# Patient Record
Sex: MALE | Race: WHITE | URBAN - METROPOLITAN AREA
[De-identification: names, ages, dates, MRNs, and addresses within clinical notes are randomized per-mention and may not be internally consistent; named-entity substitution may affect disease eponyms.]

---

## 2017-11-27 ENCOUNTER — APPOINTMENT (OUTPATIENT)
Dept: CT IMAGING | Age: 52
End: 2017-11-27
Attending: EMERGENCY MEDICINE
Payer: MEDICARE

## 2017-11-27 ENCOUNTER — HOSPITAL ENCOUNTER (EMERGENCY)
Age: 52
Discharge: HOME | End: 2017-11-27
Attending: EMERGENCY MEDICINE
Payer: MEDICARE

## 2017-11-27 VITALS
BODY MASS INDEX: 27.09 KG/M2 | HEART RATE: 78 BPM | SYSTOLIC BLOOD PRESSURE: 147 MMHG | DIASTOLIC BLOOD PRESSURE: 79 MMHG | HEIGHT: 72 IN | WEIGHT: 200 LBS | TEMPERATURE: 97.9 F | RESPIRATION RATE: 16 BRPM | OXYGEN SATURATION: 98 %

## 2017-11-27 DIAGNOSIS — N20.0 KIDNEY STONE: Primary | ICD-10-CM

## 2017-11-27 LAB
ALBUMIN SERPL-MCNC: 4.1 G/DL (ref 3.5–5)
ALBUMIN/GLOB SERPL: 1 {RATIO} (ref 1.2–3.5)
ALP SERPL-CCNC: 94 U/L (ref 50–136)
ALT SERPL-CCNC: 90 U/L (ref 12–65)
AMORPH CRY URNS QL MICRO: ABNORMAL
ANION GAP SERPL CALC-SCNC: 9 MMOL/L (ref 7–16)
AST SERPL-CCNC: 66 U/L (ref 15–37)
BACTERIA URNS QL MICRO: 0 /HPF
BASOPHILS # BLD: 0 K/UL (ref 0–0.2)
BASOPHILS NFR BLD: 0 % (ref 0–2)
BILIRUB SERPL-MCNC: 0.9 MG/DL (ref 0.2–1.1)
BUN SERPL-MCNC: 15 MG/DL (ref 6–23)
CALCIUM SERPL-MCNC: 9.2 MG/DL (ref 8.3–10.4)
CASTS URNS QL MICRO: 0 /LPF
CHLORIDE SERPL-SCNC: 99 MMOL/L (ref 98–107)
CO2 SERPL-SCNC: 29 MMOL/L (ref 21–32)
CREAT SERPL-MCNC: 1.15 MG/DL (ref 0.8–1.5)
CRYSTALS URNS QL MICRO: 0 /LPF
DIFFERENTIAL METHOD BLD: ABNORMAL
EOSINOPHIL # BLD: 0.1 K/UL (ref 0–0.8)
EOSINOPHIL NFR BLD: 1 % (ref 0.5–7.8)
EPI CELLS #/AREA URNS HPF: 0 /HPF
ERYTHROCYTE [DISTWIDTH] IN BLOOD BY AUTOMATED COUNT: 12.1 % (ref 11.9–14.6)
GLOBULIN SER CALC-MCNC: 4.3 G/DL (ref 2.3–3.5)
GLUCOSE SERPL-MCNC: 129 MG/DL (ref 65–100)
HCT VFR BLD AUTO: 46.5 % (ref 41.1–50.3)
HGB BLD-MCNC: 16.6 G/DL (ref 13.6–17.2)
IMM GRANULOCYTES # BLD: 0 K/UL (ref 0–0.5)
IMM GRANULOCYTES NFR BLD AUTO: 0 % (ref 0–5)
LYMPHOCYTES # BLD: 5.1 K/UL (ref 0.5–4.6)
LYMPHOCYTES NFR BLD: 36 % (ref 13–44)
MCH RBC QN AUTO: 33.7 PG (ref 26.1–32.9)
MCHC RBC AUTO-ENTMCNC: 35.7 G/DL (ref 31.4–35)
MCV RBC AUTO: 94.3 FL (ref 79.6–97.8)
MONOCYTES # BLD: 1.4 K/UL (ref 0.1–1.3)
MONOCYTES NFR BLD: 10 % (ref 4–12)
MUCOUS THREADS URNS QL MICRO: 0 /LPF
NEUTS SEG # BLD: 7.4 K/UL (ref 1.7–8.2)
NEUTS SEG NFR BLD: 53 % (ref 43–78)
PLATELET # BLD AUTO: 277 K/UL (ref 150–450)
PMV BLD AUTO: 10.8 FL (ref 10.8–14.1)
POTASSIUM SERPL-SCNC: 4.3 MMOL/L (ref 3.5–5.1)
PROT SERPL-MCNC: 8.4 G/DL (ref 6.3–8.2)
RBC # BLD AUTO: 4.93 M/UL (ref 4.23–5.67)
RBC #/AREA URNS HPF: ABNORMAL /HPF
SODIUM SERPL-SCNC: 137 MMOL/L (ref 136–145)
WBC # BLD AUTO: 13.9 K/UL (ref 4.3–11.1)
WBC URNS QL MICRO: ABNORMAL /HPF

## 2017-11-27 PROCEDURE — 99283 EMERGENCY DEPT VISIT LOW MDM: CPT | Performed by: EMERGENCY MEDICINE

## 2017-11-27 PROCEDURE — 96361 HYDRATE IV INFUSION ADD-ON: CPT | Performed by: EMERGENCY MEDICINE

## 2017-11-27 PROCEDURE — 96375 TX/PRO/DX INJ NEW DRUG ADDON: CPT | Performed by: EMERGENCY MEDICINE

## 2017-11-27 PROCEDURE — 74011250636 HC RX REV CODE- 250/636: Performed by: EMERGENCY MEDICINE

## 2017-11-27 PROCEDURE — 74176 CT ABD & PELVIS W/O CONTRAST: CPT

## 2017-11-27 PROCEDURE — 96374 THER/PROPH/DIAG INJ IV PUSH: CPT | Performed by: EMERGENCY MEDICINE

## 2017-11-27 PROCEDURE — 81015 MICROSCOPIC EXAM OF URINE: CPT | Performed by: EMERGENCY MEDICINE

## 2017-11-27 PROCEDURE — 80053 COMPREHEN METABOLIC PANEL: CPT | Performed by: EMERGENCY MEDICINE

## 2017-11-27 PROCEDURE — 85025 COMPLETE CBC W/AUTO DIFF WBC: CPT | Performed by: EMERGENCY MEDICINE

## 2017-11-27 RX ORDER — SODIUM CHLORIDE 0.9 % (FLUSH) 0.9 %
5-10 SYRINGE (ML) INJECTION AS NEEDED
Status: DISCONTINUED | OUTPATIENT
Start: 2017-11-27 | End: 2017-11-27 | Stop reason: HOSPADM

## 2017-11-27 RX ORDER — BUPRENORPHINE AND NALOXONE 8; 2 MG/1; MG/1
FILM, SOLUBLE BUCCAL; SUBLINGUAL DAILY
COMMUNITY
End: 2018-01-30

## 2017-11-27 RX ORDER — ONDANSETRON 2 MG/ML
4 INJECTION INTRAMUSCULAR; INTRAVENOUS
Status: COMPLETED | OUTPATIENT
Start: 2017-11-27 | End: 2017-11-27

## 2017-11-27 RX ORDER — ONDANSETRON 4 MG/1
4 TABLET, ORALLY DISINTEGRATING ORAL
Qty: 20 TAB | Refills: 0 | Status: SHIPPED | OUTPATIENT
Start: 2017-11-27 | End: 2018-01-19 | Stop reason: CLARIF

## 2017-11-27 RX ORDER — DEXTROAMPHETAMINE SACCHARATE, AMPHETAMINE ASPARTATE, DEXTROAMPHETAMINE SULFATE AND AMPHETAMINE SULFATE 2.5; 2.5; 2.5; 2.5 MG/1; MG/1; MG/1; MG/1
10 TABLET ORAL
COMMUNITY
End: 2018-01-20

## 2017-11-27 RX ORDER — KETOROLAC TROMETHAMINE 30 MG/ML
30 INJECTION, SOLUTION INTRAMUSCULAR; INTRAVENOUS
Status: COMPLETED | OUTPATIENT
Start: 2017-11-27 | End: 2017-11-27

## 2017-11-27 RX ORDER — ALPRAZOLAM 2 MG/1
TABLET ORAL
COMMUNITY
End: 2018-01-20

## 2017-11-27 RX ORDER — SODIUM CHLORIDE 0.9 % (FLUSH) 0.9 %
5-10 SYRINGE (ML) INJECTION EVERY 8 HOURS
Status: DISCONTINUED | OUTPATIENT
Start: 2017-11-27 | End: 2017-11-27 | Stop reason: HOSPADM

## 2017-11-27 RX ORDER — HYDROCODONE BITARTRATE AND ACETAMINOPHEN 10; 325 MG/1; MG/1
1 TABLET ORAL
Qty: 15 TAB | Refills: 0 | Status: SHIPPED | OUTPATIENT
Start: 2017-11-27 | End: 2018-01-19 | Stop reason: CLARIF

## 2017-11-27 RX ADMIN — ONDANSETRON 4 MG: 2 INJECTION INTRAMUSCULAR; INTRAVENOUS at 13:52

## 2017-11-27 RX ADMIN — SODIUM CHLORIDE 1000 ML: 900 INJECTION, SOLUTION INTRAVENOUS at 13:51

## 2017-11-27 RX ADMIN — KETOROLAC TROMETHAMINE 30 MG: 30 INJECTION, SOLUTION INTRAMUSCULAR at 13:52

## 2017-11-27 NOTE — DISCHARGE INSTRUCTIONS

## 2017-11-27 NOTE — ED NOTES
I have reviewed discharge instructions with the patient. The patient verbalized understanding. Patient left ED via Discharge Method: ambulatory to Home alone in no acute distress. Opportunity for questions and clarification provided. Patient given 2 scripts. To continue your aftercare when you leave the hospital, you may receive an automated call from our care team to check in on how you are doing. This is a free service and part of our promise to provide the best care and service to meet your aftercare needs.  If you have questions, or wish to unsubscribe from this service please call [phone]. Thank you for Choosing our [facility] Emergency Department.

## 2017-11-27 NOTE — ED TRIAGE NOTES
Pt states pain in left flank since yesterday. States he has been able to urinate. Hx of kidney stones.

## 2017-11-27 NOTE — ED PROVIDER NOTES
HPI Comments: Patient with history kidney stones. Present with left flank pain starting last night. Has some dysuria still able to urinate with no blood. Continue today so came in. Mild nausea present. Patient is a 47 y.o. male presenting with flank pain. The history is provided by the patient. No  was used. Flank Pain    This is a new problem. The current episode started yesterday. The problem has not changed since onset. The problem occurs constantly. Patient reports not work related injury. The pain is associated with no known injury. The pain is present in the left side. The quality of the pain is described as aching and sharp. The pain does not radiate. The pain is moderate. Associated symptoms include dysuria. Pertinent negatives include no chest pain, no fever, no numbness, no headaches, no abdominal pain, no abdominal swelling, no bowel incontinence, no perianal numbness, no bladder incontinence, no leg pain, no paresthesias and no weakness. He has tried nothing for the symptoms. Risk factors include history of kidney stones. Past Medical History:   Diagnosis Date    Kidney stones        Past Surgical History:   Procedure Laterality Date    HX ORTHOPAEDIC      back x 5; right knee; left shoulder    HX UROLOGICAL      kidney stone         History reviewed. No pertinent family history. Social History     Social History    Marital status:      Spouse name: N/A    Number of children: N/A    Years of education: N/A     Occupational History    Not on file. Social History Main Topics    Smoking status: Current Every Day Smoker     Packs/day: 0.50     Years: 10.00    Smokeless tobacco: Never Used    Alcohol use No    Drug use: No    Sexual activity: Not on file     Other Topics Concern    Not on file     Social History Narrative         ALLERGIES: Codeine    Review of Systems   Constitutional: Negative for chills and fever.    HENT: Negative for rhinorrhea and sore throat. Eyes: Negative for pain and redness. Respiratory: Negative for chest tightness, shortness of breath and wheezing. Cardiovascular: Negative for chest pain and leg swelling. Gastrointestinal: Negative for abdominal pain, bowel incontinence, diarrhea, nausea and vomiting. Genitourinary: Positive for dysuria and flank pain. Negative for bladder incontinence and hematuria. Musculoskeletal: Negative for back pain, gait problem, neck pain and neck stiffness. Skin: Negative for color change and rash. Neurological: Negative for weakness, numbness, headaches and paresthesias. Vitals:    11/27/17 1306   BP: (!) 167/109   Pulse: 88   Resp: 16   Temp: 98 °F (36.7 °C)   SpO2: 98%   Weight: 90.7 kg (200 lb)   Height: 6' (1.829 m)            Physical Exam   Constitutional: He is oriented to person, place, and time. He appears well-developed and well-nourished. HENT:   Head: Normocephalic and atraumatic. Neck: Normal range of motion. Neck supple. Cardiovascular: Normal rate and regular rhythm. No murmur heard. Pulmonary/Chest: Effort normal and breath sounds normal. He has no wheezes. Abdominal: Soft. Bowel sounds are normal. There is tenderness (mild left flank). There is no rebound and no guarding. Musculoskeletal: Normal range of motion. He exhibits no edema. Neurological: He is alert and oriented to person, place, and time. Skin: Skin is warm and dry. Nursing note and vitals reviewed. MDM  Number of Diagnoses or Management Options  Diagnosis management comments: Kidney stone. Will discharge.         Amount and/or Complexity of Data Reviewed  Clinical lab tests: ordered and reviewed  Tests in the radiology section of CPT®: ordered and reviewed  Tests in the medicine section of CPT®: ordered and reviewed    Patient Progress  Patient progress: stable    ED Course       Procedures           Results Include:    Recent Results (from the past 24 hour(s))   CBC WITH AUTOMATED DIFF    Collection Time: 11/27/17  1:15 PM   Result Value Ref Range    WBC 13.9 (H) 4.3 - 11.1 K/uL    RBC 4.93 4.23 - 5.67 M/uL    HGB 16.6 13.6 - 17.2 g/dL    HCT 46.5 41.1 - 50.3 %    MCV 94.3 79.6 - 97.8 FL    MCH 33.7 (H) 26.1 - 32.9 PG    MCHC 35.7 (H) 31.4 - 35.0 g/dL    RDW 12.1 11.9 - 14.6 %    PLATELET 968 103 - 219 K/uL    MPV 10.8 10.8 - 14.1 FL    DF AUTOMATED      NEUTROPHILS 53 43 - 78 %    LYMPHOCYTES 36 13 - 44 %    MONOCYTES 10 4.0 - 12.0 %    EOSINOPHILS 1 0.5 - 7.8 %    BASOPHILS 0 0.0 - 2.0 %    IMMATURE GRANULOCYTES 0 0.0 - 5.0 %    ABS. NEUTROPHILS 7.4 1.7 - 8.2 K/UL    ABS. LYMPHOCYTES 5.1 (H) 0.5 - 4.6 K/UL    ABS. MONOCYTES 1.4 (H) 0.1 - 1.3 K/UL    ABS. EOSINOPHILS 0.1 0.0 - 0.8 K/UL    ABS. BASOPHILS 0.0 0.0 - 0.2 K/UL    ABS. IMM. GRANS. 0.0 0.0 - 0.5 K/UL   METABOLIC PANEL, COMPREHENSIVE    Collection Time: 11/27/17  1:15 PM   Result Value Ref Range    Sodium 137 136 - 145 mmol/L    Potassium 4.3 3.5 - 5.1 mmol/L    Chloride 99 98 - 107 mmol/L    CO2 29 21 - 32 mmol/L    Anion gap 9 7 - 16 mmol/L    Glucose 129 (H) 65 - 100 mg/dL    BUN 15 6 - 23 MG/DL    Creatinine 1.15 0.8 - 1.5 MG/DL    GFR est AA >60 >60 ml/min/1.73m2    GFR est non-AA >60 >60 ml/min/1.73m2    Calcium 9.2 8.3 - 10.4 MG/DL    Bilirubin, total 0.9 0.2 - 1.1 MG/DL    ALT (SGPT) 90 (H) 12 - 65 U/L    AST (SGOT) 66 (H) 15 - 37 U/L    Alk.  phosphatase 94 50 - 136 U/L    Protein, total 8.4 (H) 6.3 - 8.2 g/dL    Albumin 4.1 3.5 - 5.0 g/dL    Globulin 4.3 (H) 2.3 - 3.5 g/dL    A-G Ratio 1.0 (L) 1.2 - 3.5     URINE MICROSCOPIC    Collection Time: 11/27/17  1:47 PM   Result Value Ref Range    WBC 3-5 0 /hpf    RBC 5-10 0 /hpf    Epithelial cells 0 0 /hpf    Bacteria 0 0 /hpf    Casts 0 0 /lpf    Crystals, urine 0 0 /LPF    Amorphous Crystals 2+ (H) 0    Mucus 0 0 /lpf      CT UROGRAM WO CONT (Final result) Result time: 11/27/17 14:26:30     Final result by Sukhjinder Floyd MD (11/27/17 14:26:30)     Impression:     IMPRESSION:     1.  Acute mild left hydronephrosis caused by small partially obstructing calculi  at the left UVJ. 2.  Bilateral small nonobstructing renal calculi. 3.  Other incidental findings as above.               Narrative:     CT ABDOMEN AND PELVIS WITHOUT CONTRAST    HISTORY: flank pain, 52 years Male SEVERE LT FLANK PAIN SINCE LAST PM / HX OF  SMALL KIDNEY STONES / SX DUE TO STONES AS WELL     COMPARISON: None available    TECHNIQUE: No oral or intravenous contrast administered. Axial helical CT images  were obtained from above the diaphragm through the pelvis.  Radiation dose  reduction techniques were used for this study:  Our CT scanners use one or all  of the following: Automated exposure control, adjustment of the mA and/or kVp  according to patient's size, iterative reconstruction. FINDINGS:    ABDOMEN:  Minimal dependent subsegmental atelectasis bilateral lung bases. Normal-appearing liver, gallbladder, spleen, pancreas, bilateral adrenal glands.   There are punctate nonobstructing calculi in the bilateral renal medullary  levels, in addition there is a small nonobstructing calculus in the left renal  lower pole measuring 3 x 3 mm.  There is acute mild left hydronephrosis and  hydroureter, cause by multiple small partially obstructing calculi in the distal  left ureter extending to the ureterovesical junction, measuring up to 5 x 6 mm. Minimal calcific atherosclerosis of a normal caliber abdominal aorta.  No  evidence of significant lymphadenopathy.  Normal-appearing small bowel.  No  evidence of intraperitoneal free air or free fluid. PELVIS:  Normal-appearing urinary bladder.  Coarse prostatic calcifications.    Normal-appearing seminal vesicles.  Normal-appearing colon, appendix not  visualized on this noncontrast study.  No evidence of pelvic free fluid.  No  evidence of significant inguinal or pelvic sidewall lymphadenopathy.  L4-L5  posterior lumbar fusion hardware causing metallic streak artifact.  Visualized  osseous structures otherwise unremarkable.

## 2018-01-19 ENCOUNTER — HOSPITAL ENCOUNTER (EMERGENCY)
Age: 53
Discharge: HOME | End: 2018-01-20
Attending: EMERGENCY MEDICINE
Payer: MEDICARE

## 2018-01-19 ENCOUNTER — APPOINTMENT (OUTPATIENT)
Dept: GENERAL RADIOLOGY | Age: 53
End: 2018-01-19
Attending: EMERGENCY MEDICINE
Payer: MEDICARE

## 2018-01-19 DIAGNOSIS — R07.9 CHEST PAIN, UNSPECIFIED TYPE: Primary | ICD-10-CM

## 2018-01-19 DIAGNOSIS — M54.5 CHRONIC LOW BACK PAIN, UNSPECIFIED BACK PAIN LATERALITY, WITH SCIATICA PRESENCE UNSPECIFIED: ICD-10-CM

## 2018-01-19 DIAGNOSIS — G89.29 CHRONIC LOW BACK PAIN, UNSPECIFIED BACK PAIN LATERALITY, WITH SCIATICA PRESENCE UNSPECIFIED: ICD-10-CM

## 2018-01-19 LAB
ALBUMIN SERPL-MCNC: 3.8 G/DL (ref 3.5–5)
ALBUMIN/GLOB SERPL: 0.9 {RATIO} (ref 1.2–3.5)
ALP SERPL-CCNC: 101 U/L (ref 50–136)
ALT SERPL-CCNC: 96 U/L (ref 12–65)
AMPHET UR QL SCN: POSITIVE
ANION GAP SERPL CALC-SCNC: 8 MMOL/L (ref 7–16)
AST SERPL-CCNC: 70 U/L (ref 15–37)
BACTERIA URNS QL MICRO: 0 /HPF
BARBITURATES UR QL SCN: NEGATIVE
BASOPHILS # BLD: 0 K/UL (ref 0–0.2)
BASOPHILS NFR BLD: 0 % (ref 0–2)
BENZODIAZ UR QL: POSITIVE
BILIRUB SERPL-MCNC: 0.6 MG/DL (ref 0.2–1.1)
BUN SERPL-MCNC: 12 MG/DL (ref 6–23)
CALCIUM SERPL-MCNC: 8.9 MG/DL (ref 8.3–10.4)
CANNABINOIDS UR QL SCN: NEGATIVE
CASTS URNS QL MICRO: 0 /LPF
CHLORIDE SERPL-SCNC: 102 MMOL/L (ref 98–107)
CO2 SERPL-SCNC: 31 MMOL/L (ref 21–32)
COCAINE UR QL SCN: NEGATIVE
CREAT SERPL-MCNC: 0.92 MG/DL (ref 0.8–1.5)
CRYSTALS URNS QL MICRO: 0 /LPF
DIFFERENTIAL METHOD BLD: ABNORMAL
EOSINOPHIL # BLD: 0.2 K/UL (ref 0–0.8)
EOSINOPHIL NFR BLD: 2 % (ref 0.5–7.8)
EPI CELLS #/AREA URNS HPF: NORMAL /HPF
ERYTHROCYTE [DISTWIDTH] IN BLOOD BY AUTOMATED COUNT: 12.2 % (ref 11.9–14.6)
GLOBULIN SER CALC-MCNC: 4.1 G/DL (ref 2.3–3.5)
GLUCOSE SERPL-MCNC: 104 MG/DL (ref 65–100)
HCT VFR BLD AUTO: 43.7 % (ref 41.1–50.3)
HGB BLD-MCNC: 15.9 G/DL (ref 13.6–17.2)
IMM GRANULOCYTES # BLD: 0 K/UL (ref 0–0.5)
IMM GRANULOCYTES NFR BLD AUTO: 0 % (ref 0–5)
LYMPHOCYTES # BLD: 4.2 K/UL (ref 0.5–4.6)
LYMPHOCYTES NFR BLD: 38 % (ref 13–44)
MCH RBC QN AUTO: 34.2 PG (ref 26.1–32.9)
MCHC RBC AUTO-ENTMCNC: 36.4 G/DL (ref 31.4–35)
MCV RBC AUTO: 94 FL (ref 79.6–97.8)
METHADONE UR QL: NEGATIVE
MONOCYTES # BLD: 0.9 K/UL (ref 0.1–1.3)
MONOCYTES NFR BLD: 8 % (ref 4–12)
MUCOUS THREADS URNS QL MICRO: 0 /LPF
NEUTS SEG # BLD: 5.7 K/UL (ref 1.7–8.2)
NEUTS SEG NFR BLD: 52 % (ref 43–78)
OPIATES UR QL: NEGATIVE
OTHER OBSERVATIONS,UCOM: NORMAL
PCP UR QL: NEGATIVE
PLATELET # BLD AUTO: 223 K/UL (ref 150–450)
PMV BLD AUTO: 10.8 FL (ref 10.8–14.1)
POTASSIUM SERPL-SCNC: 3.6 MMOL/L (ref 3.5–5.1)
PROT SERPL-MCNC: 7.9 G/DL (ref 6.3–8.2)
RBC # BLD AUTO: 4.65 M/UL (ref 4.23–5.67)
RBC #/AREA URNS HPF: NORMAL /HPF
SODIUM SERPL-SCNC: 141 MMOL/L (ref 136–145)
TROPONIN I SERPL-MCNC: <0.02 NG/ML (ref 0.02–0.05)
TROPONIN I SERPL-MCNC: <0.02 NG/ML (ref 0.02–0.05)
WBC # BLD AUTO: 11 K/UL (ref 4.3–11.1)
WBC URNS QL MICRO: NORMAL /HPF
YEAST URNS QL MICRO: NORMAL

## 2018-01-19 PROCEDURE — 80053 COMPREHEN METABOLIC PANEL: CPT | Performed by: EMERGENCY MEDICINE

## 2018-01-19 PROCEDURE — 81003 URINALYSIS AUTO W/O SCOPE: CPT | Performed by: EMERGENCY MEDICINE

## 2018-01-19 PROCEDURE — 99285 EMERGENCY DEPT VISIT HI MDM: CPT | Performed by: EMERGENCY MEDICINE

## 2018-01-19 PROCEDURE — 93005 ELECTROCARDIOGRAM TRACING: CPT | Performed by: EMERGENCY MEDICINE

## 2018-01-19 PROCEDURE — 71046 X-RAY EXAM CHEST 2 VIEWS: CPT

## 2018-01-19 PROCEDURE — 81015 MICROSCOPIC EXAM OF URINE: CPT | Performed by: EMERGENCY MEDICINE

## 2018-01-19 PROCEDURE — 80307 DRUG TEST PRSMV CHEM ANLYZR: CPT | Performed by: EMERGENCY MEDICINE

## 2018-01-19 PROCEDURE — 84484 ASSAY OF TROPONIN QUANT: CPT | Performed by: EMERGENCY MEDICINE

## 2018-01-19 PROCEDURE — 85025 COMPLETE CBC W/AUTO DIFF WBC: CPT | Performed by: EMERGENCY MEDICINE

## 2018-01-19 RX ORDER — SODIUM CHLORIDE 0.9 % (FLUSH) 0.9 %
5-10 SYRINGE (ML) INJECTION AS NEEDED
Status: DISCONTINUED | OUTPATIENT
Start: 2018-01-19 | End: 2018-01-20 | Stop reason: HOSPADM

## 2018-01-19 RX ORDER — SODIUM CHLORIDE 0.9 % (FLUSH) 0.9 %
5-10 SYRINGE (ML) INJECTION EVERY 8 HOURS
Status: DISCONTINUED | OUTPATIENT
Start: 2018-01-19 | End: 2018-01-20 | Stop reason: HOSPADM

## 2018-01-20 ENCOUNTER — HOSPITAL ENCOUNTER (EMERGENCY)
Age: 53
Discharge: HOME | End: 2018-01-22
Attending: EMERGENCY MEDICINE
Payer: MEDICARE

## 2018-01-20 VITALS
BODY MASS INDEX: 27.09 KG/M2 | HEIGHT: 72 IN | SYSTOLIC BLOOD PRESSURE: 137 MMHG | TEMPERATURE: 98.5 F | HEART RATE: 100 BPM | WEIGHT: 200 LBS | RESPIRATION RATE: 20 BRPM | DIASTOLIC BLOOD PRESSURE: 79 MMHG | OXYGEN SATURATION: 94 %

## 2018-01-20 DIAGNOSIS — F33.9 EPISODE OF RECURRENT MAJOR DEPRESSIVE DISORDER, UNSPECIFIED DEPRESSION EPISODE SEVERITY (HCC): Primary | ICD-10-CM

## 2018-01-20 DIAGNOSIS — F69 UNSPECIFIED DISORDER OF ADULT PERSONALITY AND BEHAVIOR: ICD-10-CM

## 2018-01-20 DIAGNOSIS — F11.20 SEVERE OPIOID USE DISORDER (HCC): ICD-10-CM

## 2018-01-20 LAB
ATRIAL RATE: 90 BPM
CALCULATED P AXIS, ECG09: 29 DEGREES
CALCULATED R AXIS, ECG10: 45 DEGREES
CALCULATED T AXIS, ECG11: 31 DEGREES
DIAGNOSIS, 93000: NORMAL
P-R INTERVAL, ECG05: 118 MS
Q-T INTERVAL, ECG07: 358 MS
QRS DURATION, ECG06: 86 MS
QTC CALCULATION (BEZET), ECG08: 437 MS
VENTRICULAR RATE, ECG03: 90 BPM

## 2018-01-20 PROCEDURE — 74011250637 HC RX REV CODE- 250/637: Performed by: EMERGENCY MEDICINE

## 2018-01-20 PROCEDURE — 99285 EMERGENCY DEPT VISIT HI MDM: CPT | Performed by: EMERGENCY MEDICINE

## 2018-01-20 RX ORDER — CLONIDINE HYDROCHLORIDE 0.1 MG/1
0.1 TABLET ORAL
Status: DISCONTINUED | OUTPATIENT
Start: 2018-01-20 | End: 2018-01-22 | Stop reason: HOSPADM

## 2018-01-20 RX ORDER — CLONAZEPAM 1 MG/1
0.5 TABLET ORAL
Status: DISCONTINUED | OUTPATIENT
Start: 2018-01-20 | End: 2018-01-22 | Stop reason: HOSPADM

## 2018-01-20 RX ORDER — TRAZODONE HYDROCHLORIDE 50 MG/1
50 TABLET ORAL
Status: DISCONTINUED | OUTPATIENT
Start: 2018-01-20 | End: 2018-01-22 | Stop reason: HOSPADM

## 2018-01-20 RX ORDER — SERTRALINE HYDROCHLORIDE 25 MG/1
50 TABLET, FILM COATED ORAL DAILY
Status: DISCONTINUED | OUTPATIENT
Start: 2018-01-21 | End: 2018-01-22 | Stop reason: HOSPADM

## 2018-01-20 RX ORDER — CLONAZEPAM 1 MG/1
0.5 TABLET ORAL
Status: DISCONTINUED | OUTPATIENT
Start: 2018-01-20 | End: 2018-01-20 | Stop reason: SDUPTHER

## 2018-01-20 RX ADMIN — CLONIDINE HYDROCHLORIDE 0.1 MG: 0.1 TABLET ORAL at 21:05

## 2018-01-20 RX ADMIN — CLONAZEPAM 0.5 MG: 1 TABLET ORAL at 18:49

## 2018-01-20 RX ADMIN — TRAZODONE HYDROCHLORIDE 50 MG: 50 TABLET ORAL at 21:05

## 2018-01-20 NOTE — ED NOTES
Patient informed that he may wait in the bed for the MD to come back and speak with him; patient informed that he may wait until the morning for a .

## 2018-01-20 NOTE — ED PROVIDER NOTES
HPI Comments: Patient is a 50-year-old male who presents to the emergency department [facility] with multiple complaints. At triage she complained of back pain, chest pain, and shortness of breath. Patient tells me that he suffers from chronic back pain and goes to pain management Center and he has had multiple prior back surgeries. He states that he has also had 2 prior heart attacks and he has been having chest pains for the past 2 or 3 days. He cannot tell me what intervention was done for his heart attack or where he was treated. We have no prior records of any cardiac evaluation our institution. At the triage he complained to the nurse of being homeless and having an argument with his brother. But he makes no mention of that during my history. Patient is a 47 y.o. male presenting with shortness of breath. The history is provided by the patient. Shortness of Breath   This is a new problem. The current episode started more than 2 days ago. The problem has not changed since onset. Associated symptoms include chest pain. Pertinent negatives include no fever, no cough, no sputum production and no abdominal pain. Past Medical History:   Diagnosis Date    Kidney stones        Past Surgical History:   Procedure Laterality Date    HX ORTHOPAEDIC      back x 5; right knee; left shoulder    HX UROLOGICAL      kidney stone         History reviewed. No pertinent family history. Social History     Social History    Marital status:      Spouse name: N/A    Number of children: N/A    Years of education: N/A     Occupational History    Not on file.      Social History Main Topics    Smoking status: Current Every Day Smoker     Packs/day: 0.50     Years: 10.00    Smokeless tobacco: Never Used    Alcohol use No    Drug use: No    Sexual activity: Not on file     Other Topics Concern    Not on file     Social History Narrative         ALLERGIES: Codeine    Review of Systems   Constitutional: Negative for chills and fever. HENT: Negative. Eyes: Negative. Respiratory: Positive for shortness of breath. Negative for cough and sputum production. Cardiovascular: Positive for chest pain. Gastrointestinal: Negative for abdominal pain. Endocrine: Negative. Genitourinary: Negative. Musculoskeletal: Positive for back pain. Skin: Negative. Vitals:    01/19/18 2031 01/19/18 2245   BP: (!) 159/107 132/84   Pulse: (!) 102    Resp: 24    Temp: 98.4 °F (36.9 °C)    SpO2: 98% 94%   Weight: 90.7 kg (200 lb)    Height: 6' (1.829 m)             Physical Exam   Constitutional: He appears well-developed and well-nourished. Sleeping soundly on the stretcher in the hallway   HENT:   Head: Normocephalic and atraumatic. Eyes: Conjunctivae and EOM are normal. Pupils are equal, round, and reactive to light. Neck: Normal range of motion. Neck supple. Cardiovascular: Normal rate, regular rhythm and intact distal pulses. Pulmonary/Chest: Effort normal and breath sounds normal. No respiratory distress. He exhibits tenderness. Abdominal: There is no tenderness. There is no rebound. Musculoskeletal: Normal range of motion. He exhibits no edema. Healed surgical scar over the lumbar spine   Skin: Skin is warm and dry. Nursing note and vitals reviewed.        MDM  Number of Diagnoses or Management Options  Diagnosis management comments: Differential diagnosis includes angina, arrhythmia, myocardial infarction, chronic pain syndrome, substance abuse, homelessness    EKG shows normal sinus rhythm at a rate of 90 with no acute ischemic changes    Chest x-ray shows no acute disease    CBC and basic metabolic panel are normal initial troponin is negative       Amount and/or Complexity of Data Reviewed  Clinical lab tests: ordered and reviewed  Tests in the radiology section of CPT®: ordered and reviewed  Review and summarize past medical records: yes  Independent visualization of images, tracings, or specimens: yes    Risk of Complications, Morbidity, and/or Mortality  Presenting problems: moderate  Diagnostic procedures: moderate  Management options: low    Patient Progress  Patient progress: stable    ED Course   12:17 AM  Repeat troponin value remained negative. His other workup is unremarkable. He has continued to sleep soundly in the stretcher. I see no sign of acute coronary syndrome or prior cardiac disease. I feel he can safely be discharged home with follow-up with his doctor. Voice dictation software was used during the making of this note. This software is not perfect and grammatical and other typographical errors may be present. This note has been proofread, but may still contain errors.   Mahendra Landis MD; 1/20/2018 @12:18 AM   ===================================================================        Procedures
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
## 2018-01-20 NOTE — ED NOTES
I have reviewed discharge instructions with the patient. The patient verbalized understanding. Patient left ED via Discharge Method: ambulatory to Home with self. Opportunity for questions and clarification provided. Patient given 0 scripts. To continue your aftercare when you leave the hospital, you may receive an automated call from our care team to check in on how you are doing. This is a free service and part of our promise to provide the best care and service to meet your aftercare needs.  If you have questions, or wish to unsubscribe from this service please call [phone]. Thank you for Choosing our [facility] Emergency Department.

## 2018-01-20 NOTE — ED NOTES
Patient upset, patient states, \"Well where am I going to sleep tonight? I think I will just go and kill myself. \" MD notified, charge nurse notified.

## 2018-01-20 NOTE — ED TRIAGE NOTES
Pt states he wants \"a referral to a shelter because I can't take care of myself\", admits he is currently homeless and living in his car.
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
## 2018-01-20 NOTE — ED PROVIDER NOTES
HPI:  47 M, hx of heart disease, here with c/o suicidal ideations. No exact plan. Hearing voices telling him to hurt himself. No overdose. Also stated he is out of Xanax. Concerned he may develop a seizure if he doesn't get xanax. Also c/o chest pain, abdominal pain. C/o no BM for 3-4 days. C/o unable to take off his shoes for 3 days and \"I think it may be rotted off\"  C/o chronic back pain. Has an addiction doctor. Was living in [facility] but got into disagreement with brother and left. Came up here to live with other family members but his mom does not want him at her house. Unable to get in touch with his other siblings. Stated he has been living in his car for 3 days. Wants me to get him into a shelter because \"you are supposed to help me\"       ROS  Constitutional: No fever  Skin: no rash  Eye: No vision changes  ENMT: No sore throat  Respiratory:  shortness of breath,  cough  Cardiovascular:  chest pain, no palpitations  Gastrointestinal: No vomiting, no nausea, no diarrhea, constipation, no rectal bleeding,  abdominal pain  : No dysuria, no hematuria  MSK:  back pain, no muscle pain, no joint pain  Neuro: No headache, no change in mental status, no numbness, no tingling, no weakness  Psych:  anxiety,  depression    All other review of systems positive per history of present illness and the above otherwise negative or noncontributory. Visit Vitals    BP (!) 143/97 (BP 1 Location: Left arm, BP Patient Position: At rest)    Pulse (!) 112    Temp 98.1 °F (36.7 °C)    Resp 18    Ht 6' (1.829 m)    Wt 90.7 kg (200 lb)    SpO2 99%    BMI 27.12 kg/m2     Past Medical History:   Diagnosis Date    Kidney stones      Past Surgical History:   Procedure Laterality Date    HX ORTHOPAEDIC      back x 5; right knee; left shoulder    HX UROLOGICAL      kidney stone     Prior to Admission Medications   Prescriptions Last Dose Informant Patient Reported? Taking?    ALPRAZolam (XANAX) 2 mg tablet Yes No   Sig: Take  by mouth.   buprenorphine-naloxone (SUBOXONE) 8-2 mg film sublingaul film   Yes No   Sig: by SubLINGual route daily. dextroamphetamine-amphetamine (ADDERALL) 10 mg tablet   Yes No   Sig: Take 10 mg by mouth. Facility-Administered Medications: None         Adult Exam   General: alert, sitting in bed comfortably  Head: normocephalic, atraumatic  ENT: moist mucous membranes. Speaking in full sentences. No problem with secretions. Neck: supple, non-tender; full range of motion  Cardiovascular: regular rate and rhythm, normal peripheral perfusion, no pitting edema  Respiratory: lungs are clear to auscultation; normal respirations; no wheezing, rales or rhonchi  Gastrointestinal: soft, non-tender; no rebound or guarding, no peritoneal signs, no distension. + BS   Back: no step off noted at C/T/L spine. Midline lumbar scar noted. Clean/dry and intact. Musculoskeletal: decreased ROM in back due to pain. No saddle paresthesia. Removed shoes - good distal pulses. Warm. Sensation intact. No signs of cellulitis. \"my right toes is swollen\"   Minimally edematous at Rt 1st toe. No sign of abscess/celluiltis   Neurological: alert and oriented x 4, no gross focal deficits; normal speech  Psychiatric: vague c/o suicidal ideation. Unable to tell me any exact plan     MDM: Seen in ED yesterday. Had labs less than 24 hours ago. Cardiac work up negative with serial trop. Abdomen soft. Low susp for acute intra-abdominal surgical pathology  No rash. No signs/concerns for spinous fracture, epidural abscess, caudal equina, conus medullaris syndrome. SI - he is homeless. Concerned for secondary gain. Spoke with Psychiatry. Request evaluation. 18 - psychiatrist.  He agreed that there is some concern for secondary gain however also feel patient does have symptoms that consistent with depression.  Psychiatry Commented on the fact the patient is evasive, guarded, demanded, confrontational at times, manipulative, tearful and is not a very good historian. However he feels that he does have concern with his suicidal ideation and recommended admission involuntarily. There is a list of medication that was prescribed and has been ordered. Recommend to stop Xanax and Adderall. Involuntary commitment paperwork has been filled out and completed by myself. Patient is medically clear. He had full set of cardiac enzyme, lab work done less than 24 hours ago. Dragon voice recognition software was used to create this note. Although the note has been reviewed and corrected where necessary, additional errors may have been overlooked and remain in the text.  ===================================================================  [facility] ED Psychiatric RECHECK NOTE for 1/21/2018    Maylin Montana is a 47 y.o. male here for SI  ---he arrived on 1/20/2018  ---he is on papers  ---he has completed a tele-psych evaluation    Subjective:SI    Objective:  Visit Vitals    BP (!) 143/97 (BP 1 Location: Left arm, BP Patient Position: At rest)    Pulse (!) 112    Temp 98.1 °F (36.7 °C)    Resp 18    Ht 6' (1.829 m)    Wt 90.7 kg (200 lb)    SpO2 99%    BMI 27.12 kg/m2     No results found for this or any previous visit (from the past 24 hour(s)). No results found. [unfilled]      Assessment: SI, PSA    Plan: Patient on papers.  will work on placement tomorrow.      Alex David MD, 10:32 AM, 1/21/2018  ===================================================================

## 2018-01-21 PROCEDURE — 74011250637 HC RX REV CODE- 250/637: Performed by: EMERGENCY MEDICINE

## 2018-01-21 PROCEDURE — 74011250636 HC RX REV CODE- 250/636: Performed by: EMERGENCY MEDICINE

## 2018-01-21 RX ORDER — ACETAMINOPHEN 500 MG
1000 TABLET ORAL
Status: COMPLETED | OUTPATIENT
Start: 2018-01-21 | End: 2018-01-21

## 2018-01-21 RX ORDER — BUPRENORPHINE HYDROCHLORIDE AND NALOXONE HYDROCHLORIDE DIHYDRATE 8; 2 MG/1; MG/1
1 TABLET SUBLINGUAL DAILY
Status: DISCONTINUED | OUTPATIENT
Start: 2018-01-21 | End: 2018-01-22

## 2018-01-21 RX ORDER — BUPRENORPHINE HYDROCHLORIDE AND NALOXONE HYDROCHLORIDE DIHYDRATE 8; 2 MG/1; MG/1
1 TABLET SUBLINGUAL DAILY
Status: DISCONTINUED | OUTPATIENT
Start: 2018-01-22 | End: 2018-01-21

## 2018-01-21 RX ADMIN — BUPRENORPHINE AND NALOXONE 1 TABLET: 8; 2 TABLET SUBLINGUAL at 21:27

## 2018-01-21 RX ADMIN — ACETAMINOPHEN 1000 MG: 500 TABLET, FILM COATED ORAL at 21:28

## 2018-01-21 RX ADMIN — CLONAZEPAM 0.5 MG: 1 TABLET ORAL at 21:29

## 2018-01-21 RX ADMIN — SERTRALINE HYDROCHLORIDE 50 MG: 25 TABLET ORAL at 08:31

## 2018-01-21 NOTE — ED NOTES
Patient resting in bed with eyes closed, lying supine. Respirations regular. Door open for visual checks.

## 2018-01-21 NOTE — ED NOTES
Patient closes the door to his room. Patient reminded he needs to leave his door open for visual checks. Patient states, \"I know the rules. I don't have to keep my door open. \"  Security to ER and outside room. Dr. Vince Cuello to bedside to speak to patient. Patient stated to security officers at doorway, \"I'm going to knock you out. You don't need to look at me. I know the rules. \"   Dr. Vince Cuello instructed patient that he needs to leave door open for visual checks and he cannot threaten staff.

## 2018-01-21 NOTE — ED NOTES
Patient status is unchanged, resting but easily aroused, family is not at bedside, call button within reach. Patient  did not need to use bathroom. Patient was asked to rate their pain, patient was not then repositioned for comfort.

## 2018-01-21 NOTE — ED NOTES
Patient resting in bed with eyes closed, lying supine. Respirations regular and unlabored. Patient's door open for visual checks. Will continue to monitor.

## 2018-01-21 NOTE — ED NOTES
Bedside report received from [address] [place] [address]. Patient resting on bed with eyes closed, respirations equal and unlabored.

## 2018-01-21 NOTE — ED NOTES
Patient resting in bed with eyes closed, lying on right side. Respirations even and unlabored. Door opened for visual checks. Will continue to monitor.

## 2018-01-21 NOTE — ED NOTES
Patient on suicide precautions. Patient closes his door. Patient has been instructed by nurses, Dr. Ajay Parker and hospital security officers to keep his door open. Patient's door re-opened for visual checks. Patient resting in bed. Patient instructed to leave his door ajar for visual checks.

## 2018-01-21 NOTE — ED NOTES
Patient's black velcro wallet sealed in valuables bag, in front of patient and labeled. Witnessed by Agnieszka Pacer officer and Amarjit Anderson RN. Patient's sealed valuables bag, logged in by [employer] and taken to hospital safe.

## 2018-01-21 NOTE — ED NOTES
Patient back in room from shower. Patient given a sandwich tray and Pepsi to drink. Patient requests to see .

## 2018-01-21 NOTE — ED NOTES
Patient resting in bed with eyes closed, lying supine. Respirations regular. Will continue to monitor.

## 2018-01-21 NOTE — ED NOTES
Patient resting in bed with eyes closed. Patient lying supine, respirations regular. Will continue to monitor.

## 2018-01-21 NOTE — PROGRESS NOTES
Patient was calm. Receptive to  presence. Patient wanted prayer for his situation. Patient shared his family struggles and the death of his ex-wife. This was the same as the staff had said prior to my visit. Patient was thankful for my concern and follow up. Will continue to assess needs as care plan is developed.     Adrian Shepherd, staff [address], [facility]  /   [email or web address]

## 2018-01-22 VITALS
BODY MASS INDEX: 27.09 KG/M2 | OXYGEN SATURATION: 97 % | HEART RATE: 58 BPM | TEMPERATURE: 97.7 F | DIASTOLIC BLOOD PRESSURE: 74 MMHG | WEIGHT: 200 LBS | RESPIRATION RATE: 16 BRPM | SYSTOLIC BLOOD PRESSURE: 139 MMHG | HEIGHT: 72 IN

## 2018-01-22 PROCEDURE — 74011250637 HC RX REV CODE- 250/637: Performed by: EMERGENCY MEDICINE

## 2018-01-22 PROCEDURE — 74011250636 HC RX REV CODE- 250/636: Performed by: EMERGENCY MEDICINE

## 2018-01-22 RX ORDER — CLONAZEPAM 0.5 MG/1
0.5 TABLET ORAL
Qty: 5 TAB | Refills: 0 | Status: SHIPPED | OUTPATIENT
Start: 2018-01-22 | End: 2018-01-30

## 2018-01-22 RX ORDER — SERTRALINE HYDROCHLORIDE 50 MG/1
50 TABLET, FILM COATED ORAL DAILY
Qty: 14 TAB | Refills: 0 | Status: SHIPPED | OUTPATIENT
Start: 2018-01-22

## 2018-01-22 RX ORDER — TRAZODONE HYDROCHLORIDE 50 MG/1
50 TABLET ORAL
Qty: 14 TAB | Refills: 0 | Status: SHIPPED | OUTPATIENT
Start: 2018-01-22 | End: 2018-01-30

## 2018-01-22 RX ORDER — BUPRENORPHINE HYDROCHLORIDE AND NALOXONE HYDROCHLORIDE DIHYDRATE 8; 2 MG/1; MG/1
1 TABLET SUBLINGUAL DAILY
Status: DISCONTINUED | OUTPATIENT
Start: 2018-01-22 | End: 2018-01-22 | Stop reason: HOSPADM

## 2018-01-22 RX ADMIN — CLONAZEPAM 0.5 MG: 1 TABLET ORAL at 06:36

## 2018-01-22 RX ADMIN — BUPRENORPHINE AND NALOXONE 1 TABLET: 8; 2 TABLET SUBLINGUAL at 09:30

## 2018-01-22 RX ADMIN — SERTRALINE HYDROCHLORIDE 50 MG: 25 TABLET ORAL at 09:30

## 2018-01-22 NOTE — ED NOTES
Patient awake, sitting up in bed. Patient requesting Klonopin 0.5mg po and ice water.   Patient given ice water to drink per request.

## 2018-01-22 NOTE — PROGRESS NOTES
Patient provided with resources and explanation for [address] [facility]. Also given $8 voucher forTrazodone and Zoloft. Notified Klonopin is controlled and therefore cannot give voucher. Patient has been put up for discharge and is attempting to call family members for a ride.

## 2018-01-22 NOTE — ED NOTES
Patient resting in bed with eyes closed, lying on right side. Respirations even and unlabored. Will continue to monitor.

## 2018-01-22 NOTE — ED NOTES
Patient resting in bed with eyes closed, lying on right side. Respirations regular. Will continue to monitor.

## 2018-01-22 NOTE — ED NOTES
Patient resting in bed with eyes closed, lying supine. Respirations even and unlabored. Door open for visual checks.

## 2018-01-22 NOTE — ED NOTES
I have reviewed discharge instructions with the patient. The patient verbalized understanding. Patient left ED via Discharge Method: wheelchair to lobby with (insert name of family/friend, self, transport self). Opportunity for questions and clarification provided. Patient given 0 scripts. Patient refuses to sign discharge papers. Patient refuses vital signs. Patient discharged to lobby    To continue your aftercare when you leave the hospital, you may receive an automated call from our care team to check in on how you are doing. This is a free service and part of our promise to provide the best care and service to meet your aftercare needs.  If you have questions, or wish to unsubscribe from this service please call [phone]. Thank you for Choosing our [facility] Emergency Department.

## 2018-01-22 NOTE — ED NOTES
Reevaluation the patient to reevaluate patient's psychological condition. Interview and examination patient's patient is not threatening to harm himself or others. He is calm and cooperative. Patient describes his plans for when he is released from the hospital which included getting his \"check\". He states he gets it checked every month and is expecting his soon. Patient has been filling out paperwork for housing in [place] and expects those results to be completed the very near future. Patient states he has family members in the area that asked for money. Patient states he is a  by [employer] and states he's worked in the area in the past.  Patient states that he is intending to stay in the ED until we can find him a place to live and money for gas for his car. He was told that we do not arrange housing nor gas money. We will help him as much as we can with prescriptions. His  also give him information on voluntary inpatient outpatient treatment. At no point during the interview examination to the patient ever shows signs of hopelessness or helplessness. Patient does show signs of a manipulative behavior pattern and possibly borderline personality disorder. Patient did not speak of voices or thoughts of harming himself or others. However the patient is willing to say whatever it takes to get his way.

## 2018-01-22 NOTE — ED NOTES
Patient ambulating in halls. Patient states he needs \"pain medicine for my back. I've been asking for it all day. \"  Asked patient to return to his room and I will check with MD regarding pain medicine.

## 2018-01-22 NOTE — DISCHARGE INSTRUCTIONS
Recovering From Depression: Care Instructions  Your Care Instructions    Taking good care of yourself is important as you recover from depression. In time, your symptoms will fade as your treatment takes hold. Do not give up. Instead, focus your energy on getting better. Your mood will improve. It just takes some time. Focus on things that can help you feel better, such as being with friends and family, eating well, and getting enough rest. But take things slowly. Do not do too much too soon. You will begin to feel better gradually. Follow-up care is a key part of your treatment and safety. Be sure to make and go to all appointments, and call your doctor if you are having problems. It's also a good idea to know your test results and keep a list of the medicines you take. How can you care for yourself at home? Be realistic  · If you have a large task to do, break it up into smaller steps you can handle, and just do what you can. · You may want to put off important decisions until your depression has lifted. If you have plans that will have a major impact on your life, such as marriage, divorce, or a job change, try to wait a bit. Talk it over with friends and loved ones who can help you look at the overall picture first.  · Reaching out to people for help is important. Do not isolate yourself. Let your family and friends help you. Find someone you can trust and confide in, and talk to that person. · Be patient, and be kind to yourself. Remember that depression is not your fault and is not something you can overcome with willpower alone. Treatment is necessary for depression, just like for any other illness. Feeling better takes time, and your mood will improve little by little. Stay active  · Stay busy and get outside. Take a walk, or try some other light exercise. · Talk with your doctor about an exercise program. Exercise can help with mild depression. · Go to a movie or concert.  Take part in a Zoroastrian activity or other social gathering. Go to a [employer] game. · Ask a friend to have dinner with you. Take care of yourself  · Eat a balanced diet with plenty of fresh fruits and vegetables, whole grains, and lean protein. If you have lost your appetite, eat small snacks rather than large meals. · Avoid drinking alcohol or using illegal drugs. Do not take medicines that have not been prescribed for you. They may interfere with medicines you may be taking for depression, or they may make your depression worse. · Take your medicines exactly as they are prescribed. You may start to feel better within 1 to 3 weeks of taking antidepressant medicine. But it can take as many as 6 to 8 weeks to see more improvement. If you have questions or concerns about your medicines, or if you do not notice any improvement by 3 weeks, talk to your doctor. · If you have any side effects from your medicine, tell your doctor. Antidepressants can make you feel tired, dizzy, or nervous. Some people have dry mouth, constipation, headaches, sexual problems, or diarrhea. Many of these side effects are mild and will go away on their own after you have been taking the medicine for a few weeks. Some may last longer. Talk to your doctor if side effects are bothering you too much. You might be able to try a different medicine. · Get enough sleep. If you have problems sleeping:  ¨ Go to bed at the same time every night, and get up at the same time every morning. ¨ Keep your bedroom dark and quiet. ¨ Do not exercise after 5:00 p.m. ¨ Avoid drinks with caffeine after 5:00 p.m. · Avoid sleeping pills unless they are prescribed by the doctor treating your depression. Sleeping pills may make you groggy during the day, and they may interact with other medicine you are taking. · If you have any other illnesses, such as diabetes, heart disease, or high blood pressure, make sure to continue with your treatment.  Tell your doctor about all of the medicines you take, including those with or without a prescription. · Keep the numbers for these national suicide hotlines: 9-575-658-TALK (9-932.508.8174) and 6-669-XLQOEGC (9-466.841.9378). If you or someone you know talks about suicide or feeling hopeless, get help right away. When should you call for help? Call 911 anytime you think you may need emergency care. For example, call if:  ? · You feel like hurting yourself or someone else. ? · Someone you know has depression and is about to attempt or is attempting suicide. ?Call your doctor now or seek immediate medical care if:  ? · You hear voices. ? · Someone you know has depression and:  ¨ Starts to give away his or her possessions. ¨ Uses illegal drugs or drinks alcohol heavily. ¨ Talks or writes about death, including writing suicide notes or talking about guns, knives, or pills. ¨ Starts to spend a lot of time alone. ¨ Acts very aggressively or suddenly appears calm. ? Watch closely for changes in your health, and be sure to contact your doctor if:  ? · You do not get better as expected. Where can you learn more? Go to http://ryan-amna.info/. Enter Q676 in the search box to learn more about \"Recovering From Depression: Care Instructions. \"  Current as of: May 12, 2017  Content Version: 11.4  © 4925-6619 [employer]. Care instructions adapted under license by [employer] (which disclaims liability or warranty for this information). If you have questions about a medical condition or this instruction, always ask your healthcare professional. [address] any warranty or liability for your use of this information.

## 2018-01-22 NOTE — ED NOTES
Patient awake, sitting up in bed. Patient denies any needs at this time. Patient has full meal tray.   Patient states he may still snack on meal.

## 2018-01-22 NOTE — PROGRESS NOTES
In to speak with patient. Patient states 'if someone could help me with my back to wash up I'll leave'. States he is currently working with a place in [place] to get disability housing and is ready to go as long as we can guarantee housing. Notified that the ER could not guarantee housing but that I would be glad to call [facility] and [facility]. Patient states he cannot go to either of those places and refuses. Patient states 'well I'll just stay here then'. Notified patient that he can't stay here if he is cleared and discharged. Patient then states 'well then I just won't be discharged'. Notified Dr Filiberto Galvez.

## 2018-01-30 ENCOUNTER — HOSPITAL ENCOUNTER (INPATIENT)
Age: 53
LOS: 5 days | Discharge: PSYCHIATRIC OR SUBSTANCE USE FACILITY | DRG: 896 | End: 2018-02-04
Attending: EMERGENCY MEDICINE | Admitting: HOSPITALIST
Payer: MEDICARE

## 2018-01-30 ENCOUNTER — APPOINTMENT (OUTPATIENT)
Dept: CT IMAGING | Age: 53
DRG: 896 | End: 2018-01-30
Attending: EMERGENCY MEDICINE
Payer: MEDICARE

## 2018-01-30 DIAGNOSIS — F13.931 BENZODIAZEPINE WITHDRAWAL WITH DELIRIUM (HCC): ICD-10-CM

## 2018-01-30 DIAGNOSIS — F10.931 DELIRIUM TREMENS (HCC): Primary | ICD-10-CM

## 2018-01-30 DIAGNOSIS — N17.9 ACUTE KIDNEY INJURY (HCC): ICD-10-CM

## 2018-01-30 PROBLEM — F32.9 MAJOR DEPRESSION: Status: ACTIVE | Noted: 2018-01-30

## 2018-01-30 PROBLEM — R41.82 ALTERED MENTAL STATUS: Status: ACTIVE | Noted: 2018-01-30

## 2018-01-30 PROBLEM — R79.89 ABNORMAL LFTS: Status: ACTIVE | Noted: 2018-01-30

## 2018-01-30 LAB
ALBUMIN SERPL-MCNC: 4 G/DL (ref 3.5–5)
ALBUMIN/GLOB SERPL: 1 {RATIO} (ref 1.2–3.5)
ALP SERPL-CCNC: 92 U/L (ref 50–136)
ALT SERPL-CCNC: 73 U/L (ref 12–65)
AMMONIA PLAS-SCNC: <10 UMOL/L (ref 11–32)
AMPHET UR QL SCN: NEGATIVE
ANION GAP SERPL CALC-SCNC: 14 MMOL/L (ref 7–16)
APPEARANCE UR: ABNORMAL
AST SERPL-CCNC: 56 U/L (ref 15–37)
BACTERIA URNS QL MICRO: ABNORMAL /HPF
BARBITURATES UR QL SCN: NEGATIVE
BASOPHILS # BLD: 0 K/UL (ref 0–0.2)
BASOPHILS NFR BLD: 0 % (ref 0–2)
BENZODIAZ UR QL: POSITIVE
BILIRUB SERPL-MCNC: 0.8 MG/DL (ref 0.2–1.1)
BILIRUB UR QL: ABNORMAL
BUN SERPL-MCNC: 35 MG/DL (ref 6–23)
CALCIUM SERPL-MCNC: 9.5 MG/DL (ref 8.3–10.4)
CANNABINOIDS UR QL SCN: NEGATIVE
CASTS URNS QL MICRO: ABNORMAL /LPF
CHLORIDE SERPL-SCNC: 99 MMOL/L (ref 98–107)
CK SERPL-CCNC: 421 U/L (ref 21–215)
CO2 SERPL-SCNC: 28 MMOL/L (ref 21–32)
COCAINE UR QL SCN: NEGATIVE
COLOR UR: ABNORMAL
CREAT SERPL-MCNC: 2.04 MG/DL (ref 0.8–1.5)
CRYSTALS URNS QL MICRO: ABNORMAL /LPF
DIFFERENTIAL METHOD BLD: ABNORMAL
EOSINOPHIL # BLD: 0.1 K/UL (ref 0–0.8)
EOSINOPHIL NFR BLD: 1 % (ref 0.5–7.8)
EPI CELLS #/AREA URNS HPF: ABNORMAL /HPF
ERYTHROCYTE [DISTWIDTH] IN BLOOD BY AUTOMATED COUNT: 12.2 % (ref 11.9–14.6)
ETHANOL SERPL-MCNC: 1 MG/DL
FOLATE SERPL-MCNC: 19.8 NG/ML (ref 3.1–17.5)
GLOBULIN SER CALC-MCNC: 4.1 G/DL (ref 2.3–3.5)
GLUCOSE SERPL-MCNC: 105 MG/DL (ref 65–100)
GLUCOSE UR STRIP.AUTO-MCNC: NEGATIVE MG/DL
HCT VFR BLD AUTO: 44.3 % (ref 41.1–50.3)
HGB BLD-MCNC: 15.3 G/DL (ref 13.6–17.2)
HGB UR QL STRIP: NEGATIVE
IMM GRANULOCYTES # BLD: 0 K/UL (ref 0–0.5)
IMM GRANULOCYTES NFR BLD AUTO: 0 % (ref 0–5)
KETONES UR QL STRIP.AUTO: ABNORMAL MG/DL
LEUKOCYTE ESTERASE UR QL STRIP.AUTO: ABNORMAL
LYMPHOCYTES # BLD: 4.1 K/UL (ref 0.5–4.6)
LYMPHOCYTES NFR BLD: 33 % (ref 13–44)
MAGNESIUM SERPL-MCNC: 1.9 MG/DL (ref 1.8–2.4)
MCH RBC QN AUTO: 32.8 PG (ref 26.1–32.9)
MCHC RBC AUTO-ENTMCNC: 34.5 G/DL (ref 31.4–35)
MCV RBC AUTO: 95.1 FL (ref 79.6–97.8)
METHADONE UR QL: NEGATIVE
MONOCYTES # BLD: 1.2 K/UL (ref 0.1–1.3)
MONOCYTES NFR BLD: 9 % (ref 4–12)
MYOGLOBIN SERPL-MCNC: 1502 NG/ML (ref 16–96)
NEUTS SEG # BLD: 6.9 K/UL (ref 1.7–8.2)
NEUTS SEG NFR BLD: 57 % (ref 43–78)
NITRITE UR QL STRIP.AUTO: NEGATIVE
OPIATES UR QL: NEGATIVE
PCP UR QL: NEGATIVE
PH UR STRIP: 5 [PH] (ref 5–9)
PHOSPHATE SERPL-MCNC: 5.4 MG/DL (ref 2.5–4.5)
PLATELET # BLD AUTO: 278 K/UL (ref 150–450)
PMV BLD AUTO: 11.3 FL (ref 10.8–14.1)
POTASSIUM SERPL-SCNC: 3.7 MMOL/L (ref 3.5–5.1)
PROT SERPL-MCNC: 8.1 G/DL (ref 6.3–8.2)
PROT UR STRIP-MCNC: 30 MG/DL
RBC # BLD AUTO: 4.66 M/UL (ref 4.23–5.67)
RBC #/AREA URNS HPF: ABNORMAL /HPF
SODIUM SERPL-SCNC: 141 MMOL/L (ref 136–145)
SP GR UR REFRACTOMETRY: 1.02 (ref 1–1.02)
TSH SERPL DL<=0.005 MIU/L-ACNC: 3.43 UIU/ML (ref 0.36–3.74)
UROBILINOGEN UR QL STRIP.AUTO: 1 EU/DL (ref 0.2–1)
VIT B12 SERPL-MCNC: 900 PG/ML (ref 193–986)
WBC # BLD AUTO: 12.3 K/UL (ref 4.3–11.1)
WBC URNS QL MICRO: ABNORMAL /HPF

## 2018-01-30 PROCEDURE — 84443 ASSAY THYROID STIM HORMONE: CPT | Performed by: HOSPITALIST

## 2018-01-30 PROCEDURE — 74011250636 HC RX REV CODE- 250/636: Performed by: INTERNAL MEDICINE

## 2018-01-30 PROCEDURE — 82550 ASSAY OF CK (CPK): CPT | Performed by: HOSPITALIST

## 2018-01-30 PROCEDURE — 96374 THER/PROPH/DIAG INJ IV PUSH: CPT | Performed by: EMERGENCY MEDICINE

## 2018-01-30 PROCEDURE — 82607 VITAMIN B-12: CPT | Performed by: HOSPITALIST

## 2018-01-30 PROCEDURE — 96361 HYDRATE IV INFUSION ADD-ON: CPT | Performed by: EMERGENCY MEDICINE

## 2018-01-30 PROCEDURE — 82140 ASSAY OF AMMONIA: CPT | Performed by: EMERGENCY MEDICINE

## 2018-01-30 PROCEDURE — 74011250636 HC RX REV CODE- 250/636: Performed by: EMERGENCY MEDICINE

## 2018-01-30 PROCEDURE — 83874 ASSAY OF MYOGLOBIN: CPT | Performed by: HOSPITALIST

## 2018-01-30 PROCEDURE — 92610 EVALUATE SWALLOWING FUNCTION: CPT | Performed by: SPEECH-LANGUAGE PATHOLOGIST

## 2018-01-30 PROCEDURE — 77030032490 HC SLV COMPR SCD KNE COVD -B

## 2018-01-30 PROCEDURE — 74011250636 HC RX REV CODE- 250/636: Performed by: HOSPITALIST

## 2018-01-30 PROCEDURE — 65270000029 HC RM PRIVATE

## 2018-01-30 PROCEDURE — 93005 ELECTROCARDIOGRAM TRACING: CPT | Performed by: INTERNAL MEDICINE

## 2018-01-30 PROCEDURE — 74011000250 HC RX REV CODE- 250: Performed by: HOSPITALIST

## 2018-01-30 PROCEDURE — 77030034850

## 2018-01-30 PROCEDURE — 80074 ACUTE HEPATITIS PANEL: CPT | Performed by: HOSPITALIST

## 2018-01-30 PROCEDURE — 70450 CT HEAD/BRAIN W/O DYE: CPT

## 2018-01-30 PROCEDURE — 80307 DRUG TEST PRSMV CHEM ANLYZR: CPT | Performed by: EMERGENCY MEDICINE

## 2018-01-30 PROCEDURE — 83735 ASSAY OF MAGNESIUM: CPT | Performed by: EMERGENCY MEDICINE

## 2018-01-30 PROCEDURE — 74011000258 HC RX REV CODE- 258: Performed by: HOSPITALIST

## 2018-01-30 PROCEDURE — 81001 URINALYSIS AUTO W/SCOPE: CPT | Performed by: HOSPITALIST

## 2018-01-30 PROCEDURE — 80053 COMPREHEN METABOLIC PANEL: CPT | Performed by: EMERGENCY MEDICINE

## 2018-01-30 PROCEDURE — 84100 ASSAY OF PHOSPHORUS: CPT | Performed by: HOSPITALIST

## 2018-01-30 PROCEDURE — 81003 URINALYSIS AUTO W/O SCOPE: CPT | Performed by: EMERGENCY MEDICINE

## 2018-01-30 PROCEDURE — 82746 ASSAY OF FOLIC ACID SERUM: CPT | Performed by: HOSPITALIST

## 2018-01-30 PROCEDURE — 99285 EMERGENCY DEPT VISIT HI MDM: CPT | Performed by: EMERGENCY MEDICINE

## 2018-01-30 PROCEDURE — 85025 COMPLETE CBC W/AUTO DIFF WBC: CPT | Performed by: EMERGENCY MEDICINE

## 2018-01-30 PROCEDURE — 51798 US URINE CAPACITY MEASURE: CPT

## 2018-01-30 PROCEDURE — 74011250637 HC RX REV CODE- 250/637: Performed by: HOSPITALIST

## 2018-01-30 RX ORDER — HALOPERIDOL 5 MG/1
5 TABLET ORAL EVERY 4 HOURS
COMMUNITY
End: 2018-02-04

## 2018-01-30 RX ORDER — HALOPERIDOL 5 MG/1
5 TABLET ORAL EVERY 4 HOURS
Status: DISCONTINUED | OUTPATIENT
Start: 2018-01-30 | End: 2018-01-30

## 2018-01-30 RX ORDER — HALOPERIDOL 5 MG/ML
2 INJECTION INTRAMUSCULAR
Status: DISCONTINUED | OUTPATIENT
Start: 2018-01-30 | End: 2018-02-04 | Stop reason: HOSPADM

## 2018-01-30 RX ORDER — HALOPERIDOL 5 MG/1
5 TABLET ORAL EVERY 4 HOURS
Status: DISCONTINUED | OUTPATIENT
Start: 2018-01-30 | End: 2018-01-30 | Stop reason: SDUPTHER

## 2018-01-30 RX ORDER — LORAZEPAM 2 MG/ML
1 INJECTION INTRAMUSCULAR
Status: COMPLETED | OUTPATIENT
Start: 2018-01-30 | End: 2018-01-30

## 2018-01-30 RX ORDER — DOCUSATE SODIUM 100 MG/1
100 CAPSULE, LIQUID FILLED ORAL 2 TIMES DAILY
Status: DISCONTINUED | OUTPATIENT
Start: 2018-01-30 | End: 2018-02-04 | Stop reason: HOSPADM

## 2018-01-30 RX ORDER — SODIUM CHLORIDE 0.9 % (FLUSH) 0.9 %
5-10 SYRINGE (ML) INJECTION AS NEEDED
Status: DISCONTINUED | OUTPATIENT
Start: 2018-01-30 | End: 2018-02-04 | Stop reason: HOSPADM

## 2018-01-30 RX ORDER — LORAZEPAM 2 MG/ML
2 INJECTION INTRAMUSCULAR
Status: DISCONTINUED | OUTPATIENT
Start: 2018-01-30 | End: 2018-02-04 | Stop reason: HOSPADM

## 2018-01-30 RX ORDER — QUETIAPINE FUMARATE 50 MG/1
50 TABLET, FILM COATED ORAL 2 TIMES DAILY
COMMUNITY

## 2018-01-30 RX ORDER — ONDANSETRON 2 MG/ML
4 INJECTION INTRAMUSCULAR; INTRAVENOUS
Status: DISCONTINUED | OUTPATIENT
Start: 2018-01-30 | End: 2018-02-04 | Stop reason: HOSPADM

## 2018-01-30 RX ORDER — LOPERAMIDE HCL 2 MG
2 TABLET ORAL
COMMUNITY

## 2018-01-30 RX ORDER — HALOPERIDOL 1 MG/1
5 TABLET ORAL
Status: COMPLETED | OUTPATIENT
Start: 2018-01-30 | End: 2018-01-30

## 2018-01-30 RX ORDER — LANOLIN ALCOHOL/MO/W.PET/CERES
3 CREAM (GRAM) TOPICAL
Status: DISCONTINUED | OUTPATIENT
Start: 2018-01-30 | End: 2018-01-30 | Stop reason: SDUPTHER

## 2018-01-30 RX ORDER — LANOLIN ALCOHOL/MO/W.PET/CERES
6 CREAM (GRAM) TOPICAL
COMMUNITY
End: 2018-02-04

## 2018-01-30 RX ORDER — HYDROXYZINE PAMOATE 50 MG/1
50 CAPSULE ORAL
COMMUNITY
End: 2018-02-04

## 2018-01-30 RX ORDER — ASPIRIN 81 MG/1
TABLET ORAL DAILY
COMMUNITY

## 2018-01-30 RX ORDER — HALOPERIDOL 5 MG/ML
10 INJECTION INTRAMUSCULAR
COMMUNITY
End: 2018-02-04

## 2018-01-30 RX ORDER — SODIUM CHLORIDE 9 MG/ML
500 INJECTION, SOLUTION INTRAVENOUS ONCE
Status: COMPLETED | OUTPATIENT
Start: 2018-01-30 | End: 2018-01-30

## 2018-01-30 RX ORDER — DOCUSATE SODIUM 100 MG/1
100 CAPSULE, LIQUID FILLED ORAL 2 TIMES DAILY
COMMUNITY

## 2018-01-30 RX ORDER — DIPHENHYDRAMINE HCL 25 MG
50 CAPSULE ORAL
Status: DISCONTINUED | OUTPATIENT
Start: 2018-01-30 | End: 2018-02-04 | Stop reason: HOSPADM

## 2018-01-30 RX ORDER — BUPRENORPHINE 2 MG/1
2 TABLET SUBLINGUAL 2 TIMES DAILY
COMMUNITY
End: 2018-02-04

## 2018-01-30 RX ORDER — LISINOPRIL 10 MG/1
TABLET ORAL DAILY
COMMUNITY
End: 2018-02-04

## 2018-01-30 RX ORDER — QUETIAPINE FUMARATE 25 MG/1
50 TABLET, FILM COATED ORAL 2 TIMES DAILY
Status: DISCONTINUED | OUTPATIENT
Start: 2018-01-30 | End: 2018-01-31

## 2018-01-30 RX ORDER — LANOLIN ALCOHOL/MO/W.PET/CERES
3 CREAM (GRAM) TOPICAL
Status: DISCONTINUED | OUTPATIENT
Start: 2018-01-30 | End: 2018-02-02

## 2018-01-30 RX ORDER — BUPRENORPHINE 2 MG/1
2 TABLET SUBLINGUAL 2 TIMES DAILY
Status: DISCONTINUED | OUTPATIENT
Start: 2018-01-30 | End: 2018-01-31

## 2018-01-30 RX ORDER — SODIUM CHLORIDE 9 MG/ML
100 INJECTION, SOLUTION INTRAVENOUS CONTINUOUS
Status: DISCONTINUED | OUTPATIENT
Start: 2018-01-30 | End: 2018-01-31

## 2018-01-30 RX ORDER — SODIUM CHLORIDE 9 MG/ML
150 INJECTION, SOLUTION INTRAVENOUS ONCE
Status: COMPLETED | OUTPATIENT
Start: 2018-01-30 | End: 2018-01-30

## 2018-01-30 RX ORDER — SODIUM CHLORIDE 0.9 % (FLUSH) 0.9 %
5-10 SYRINGE (ML) INJECTION EVERY 8 HOURS
Status: DISCONTINUED | OUTPATIENT
Start: 2018-01-30 | End: 2018-02-04 | Stop reason: HOSPADM

## 2018-01-30 RX ORDER — LORAZEPAM 2 MG/ML
1 INJECTION INTRAMUSCULAR
Status: DISCONTINUED | OUTPATIENT
Start: 2018-01-30 | End: 2018-01-30

## 2018-01-30 RX ORDER — LOPERAMIDE HYDROCHLORIDE 2 MG/1
2 CAPSULE ORAL
Status: DISCONTINUED | OUTPATIENT
Start: 2018-01-30 | End: 2018-02-04 | Stop reason: HOSPADM

## 2018-01-30 RX ORDER — ASPIRIN 81 MG/1
81 TABLET ORAL DAILY
Status: DISCONTINUED | OUTPATIENT
Start: 2018-01-30 | End: 2018-02-04 | Stop reason: HOSPADM

## 2018-01-30 RX ORDER — SERTRALINE HYDROCHLORIDE 100 MG/1
100 TABLET, FILM COATED ORAL DAILY
Status: DISCONTINUED | OUTPATIENT
Start: 2018-01-30 | End: 2018-02-04 | Stop reason: HOSPADM

## 2018-01-30 RX ORDER — IBUPROFEN 800 MG/1
TABLET ORAL
COMMUNITY
End: 2018-02-04

## 2018-01-30 RX ORDER — DIPHENHYDRAMINE HCL 25 MG
50 CAPSULE ORAL
COMMUNITY
End: 2018-02-04

## 2018-01-30 RX ADMIN — SODIUM CHLORIDE 150 ML/HR: 900 INJECTION, SOLUTION INTRAVENOUS at 18:57

## 2018-01-30 RX ADMIN — SODIUM CHLORIDE 150 ML/HR: 900 INJECTION, SOLUTION INTRAVENOUS at 02:36

## 2018-01-30 RX ADMIN — LORAZEPAM 1 MG: 2 INJECTION INTRAMUSCULAR; INTRAVENOUS at 04:21

## 2018-01-30 RX ADMIN — SODIUM CHLORIDE 500 ML: 900 INJECTION, SOLUTION INTRAVENOUS at 02:47

## 2018-01-30 RX ADMIN — SODIUM CHLORIDE 150 ML/HR: 900 INJECTION, SOLUTION INTRAVENOUS at 11:01

## 2018-01-30 RX ADMIN — QUETIAPINE FUMARATE 50 MG: 25 TABLET ORAL at 08:33

## 2018-01-30 RX ADMIN — SODIUM CHLORIDE 150 ML/HR: 900 INJECTION, SOLUTION INTRAVENOUS at 04:43

## 2018-01-30 RX ADMIN — HALOPERIDOL 5 MG: 1 TABLET ORAL at 05:07

## 2018-01-30 RX ADMIN — WATER 10 MG: 1 INJECTION INTRAMUSCULAR; INTRAVENOUS; SUBCUTANEOUS at 04:23

## 2018-01-30 RX ADMIN — ASPIRIN 81 MG: 81 TABLET, COATED ORAL at 08:33

## 2018-01-30 RX ADMIN — LORAZEPAM 1 MG: 2 INJECTION INTRAMUSCULAR; INTRAVENOUS at 02:40

## 2018-01-30 RX ADMIN — DOCUSATE SODIUM 100 MG: 100 CAPSULE, LIQUID FILLED ORAL at 08:33

## 2018-01-30 RX ADMIN — WATER 10 MG: 1 INJECTION INTRAMUSCULAR; INTRAVENOUS; SUBCUTANEOUS at 05:13

## 2018-01-30 RX ADMIN — Medication 10 ML: at 04:27

## 2018-01-30 RX ADMIN — LORAZEPAM 2 MG: 2 INJECTION INTRAMUSCULAR; INTRAVENOUS at 13:56

## 2018-01-30 RX ADMIN — THIAMINE HYDROCHLORIDE 100 MG: 100 INJECTION, SOLUTION INTRAMUSCULAR; INTRAVENOUS at 08:33

## 2018-01-30 RX ADMIN — DOCUSATE SODIUM 100 MG: 100 CAPSULE, LIQUID FILLED ORAL at 17:05

## 2018-01-30 RX ADMIN — SERTRALINE HYDROCHLORIDE 100 MG: 100 TABLET ORAL at 08:33

## 2018-01-30 RX ADMIN — BUPRENORPHINE HYDROCHLORIDE SUBLINGUAL 2 MG: 2 TABLET SUBLINGUAL at 20:57

## 2018-01-30 RX ADMIN — QUETIAPINE FUMARATE 50 MG: 25 TABLET ORAL at 17:05

## 2018-01-30 RX ADMIN — HALOPERIDOL LACTATE 2 MG: 5 INJECTION, SOLUTION INTRAMUSCULAR at 23:15

## 2018-01-30 RX ADMIN — HALOPERIDOL LACTATE 2 MG: 5 INJECTION, SOLUTION INTRAMUSCULAR at 14:12

## 2018-01-30 RX ADMIN — BUPRENORPHINE HYDROCHLORIDE SUBLINGUAL 2 MG: 2 TABLET SUBLINGUAL at 08:33

## 2018-01-30 NOTE — IP AVS SNAPSHOT
[address] 
[phone] Patient: Mason Diego MRN: [id number] :1965 About your hospitalization You were admitted on:  2018 You last received care in the:  [address] [place] [address] You were discharged on:  2018 Why you were hospitalized Your primary diagnosis was:  [place] (Acute Kidney Injury) (Hcc) Your diagnoses also included: Altered Mental Status, Abnormal Lfts, Major Depression, Benzodiazepine Withdrawal With Delirium (Hcc), Metabolic Encephalopathy Follow-up Information Follow up With Details Comments Contact Info Thomas Mccarthy MD   [address] 
[phone] Discharge Orders None A check renea indicates which time of day the medication should be taken. My Medications START taking these medications Instructions Each Dose to Equal  
 Morning Noon Evening Bedtime  
 acetaminophen 325 mg tablet Commonly known as:  TYLENOL Your last dose was: Your next dose is: Take 2 Tabs by mouth every six (6) hours as needed. 650 mg  
    
   
   
   
  
 amLODIPine 10 mg tablet Commonly known as:  Saintclair Rickers Start taking on:  2018 Your last dose was: Your next dose is: Take 1 Tab by mouth daily. 10 mg  
    
   
   
   
  
 amoxicillin 500 mg capsule Commonly known as:  AMOXIL Your last dose was: Your next dose is: Take 1 Cap by mouth three (3) times daily. 500 mg  
    
   
   
   
  
 hydrALAZINE 25 mg tablet Commonly known as:  APRESOLINE Your last dose was: Your next dose is: Take 1 Tab by mouth two (2) times a day. 25 mg  
    
   
   
   
  
 potassium chloride 20 mEq tablet Commonly known as:  K-DUR, KLOR-CON Your last dose was: Your next dose is: Take 1 Tab by mouth daily. 20 mEq CHANGE how you take these medications Instructions Each Dose to Equal  
 Morning Noon Evening Bedtime  
 buprenorphine HCl 2 mg sublingual tablet Commonly known as:  SUBUTEX What changed:  how much to take Your last dose was: Your next dose is:    
   
   
 0.5 Tabs by SubLINGual route two (2) times a day. Max Daily Amount: 2 mg.  
 1 mg  
    
   
   
   
  
 diphenhydrAMINE 50 mg capsule Commonly known as:  BENADRYL What changed:   
- medication strength - when to take this Your last dose was: Your next dose is: Take 1 Cap by mouth every eight (8) hours as needed for up to 10 days. 50 mg  
    
   
   
   
  
 sertraline 50 mg tablet Commonly known as:  ZOLOFT What changed:  how much to take Your last dose was: Your next dose is: Take 1 Tab by mouth daily. 50 mg CONTINUE taking these medications Instructions Each Dose to Equal  
 Morning Noon Evening Bedtime  
 aspirin delayed-release 81 mg tablet Your last dose was: Your next dose is: Take  by mouth daily. docusate sodium 100 mg capsule Commonly known as:  Gay Seymour Your last dose was: Your next dose is: Take 100 mg by mouth two (2) times a day. 100 mg  
    
   
   
   
  
 loperamide 2 mg tablet Commonly known as:  IMMODIUM Your last dose was: Your next dose is: Take 2 mg by mouth four (4) times daily as needed for Diarrhea.  
 2 mg SEROquel 50 mg tablet Generic drug:  QUEtiapine Your last dose was: Your next dose is: Take 50 mg by mouth two (2) times a day. 50 mg  
    
   
   
   
  
  
STOP taking these medications HALDOL 5 mg/mL injection Generic drug:  haloperidol lactate haloperidol 5 mg tablet Commonly known as:  HALDOL  
   
  
 ibuprofen 800 mg tablet Commonly known as:  MOTRIN  
   
  
 lisinopril 10 mg tablet Commonly known as:  PRINIVIL, ZESTRIL  
   
  
 MAALOX PLUS EXTRA STRENGTH PO  
   
  
 melatonin 3 mg tablet VISTAMINE PO  
   
  
 VISTARIL 50 mg capsule Generic drug:  hydrOXYzine pamoate Where to Get Your Medications Information on where to get these meds will be given to you by the nurse or doctor. ! Ask your nurse or doctor about these medications  
  acetaminophen 325 mg tablet  
 amLODIPine 10 mg tablet  
 amoxicillin 500 mg capsule  
 buprenorphine HCl 2 mg sublingual tablet  
 diphenhydrAMINE 50 mg capsule  
 hydrALAZINE 25 mg tablet  
 potassium chloride 20 mEq tablet Discharge Instructions None [facility] [place] Announcement We are excited to announce that we are making your provider's discharge notes available to you in C3Nano. You will see these notes when they are completed and signed by the physician that discharged you from your recent hospital stay. If you have any questions or concerns about any information you see in C3Nano, please call the Health Information Department where you were seen or reach out to your Primary Care Provider for more information about your plan of care. Introducing [facility]! Sorin rFank introduces C3Nano patient portal. Now you can access parts of your medical record, email your doctor's office, and request medication refills online. 1. In your internet browser, go to [email or web address] 2. Click on the First Time User? Click Here link in the Sign In box. You will see the New Member Sign Up page. 3. Enter your C3Nano Access Code exactly as it appears below. You will not need to use this code after youve completed the sign-up process. If you do not sign up before the expiration date, you must request a new code. · Zaarly Access Code: [email or web address] Expires: 2/25/2018  1:00 PM 
 
4. Enter the last four digits of your Social Security Number (xxxx) and Date of Birth (mm/dd/yyyy) as indicated and click Submit. You will be taken to the next sign-up page. 5. Create a [employer] ID. This will be your Zaarly login ID and cannot be changed, so think of one that is secure and easy to remember. 6. Create a Zaarly password. You can change your password at any time. 7. Enter your Password Reset Question and Answer. This can be used at a later time if you forget your password. 8. Enter your e-mail address. You will receive e-mail notification when new information is available in [address] 9. Click Sign Up. You can now view and download portions of your medical record. 10. Click the Download Summary menu link to download a portable copy of your medical information. If you have questions, please visit the Frequently Asked Questions section of the Zaarly website. Remember, Zaarly is NOT to be used for urgent needs. For medical emergencies, dial 911. Now available from your iPhone and Android! Providers Seen During Your Hospitalization Provider Specialty Primary office phone Grace Farias MD Emergency Medicine [phone] Td Fernandes MD Internal Medicine [phone] Your Primary Care Physician (PCP) Primary Care Physician Office Phone Office Fax Drake Horne [phone] You are allergic to the following Allergen Reactions Codeine Itching Swelling Demerol (Meperidine) Other (comments) Hallucinations Recent Documentation Height Weight BMI Smoking Status 1.829 m 88.5 kg 26.45 kg/m2 Current Every Day Smoker Emergency Contacts Name Discharge Info Relation Home Work Mobile Adán Bejarano  Other Relative [6] [phone] Patient Belongings The following personal items are in your possession at time of discharge: 
                   Clothing: With patient Please provide this summary of care documentation to your next provider. Signatures-by signing, you are acknowledging that this After Visit Summary has been reviewed with you and you have received a copy. Patient Signature:  ____________________________________________________________ Date:  ____________________________________________________________  
  
Nanetta Wallagrass Provider Signature:  ____________________________________________________________ Date:  ____________________________________________________________

## 2018-01-30 NOTE — ED PROVIDER NOTES
HPI Comments: 72-year-old male with a history of major depressive disorder as well as opiate use disorder and chronic benzodiazepine use presents from [facility] with altered mental status and multiple falls today. Patient is a 47 y.o. male presenting with altered mental status. The history is provided by the EMS personnel and a caregiver. Altered mental status    This is a new problem. The current episode started 6 to 12 hours ago. The problem has not changed since onset. Associated symptoms include confusion. Mental status baseline is normal.         Past Medical History:   Diagnosis Date    Kidney stones     Psychiatric disorder     depression, psychosis        Past Surgical History:   Procedure Laterality Date    HX ORTHOPAEDIC      back x 5; right knee; left shoulder    HX UROLOGICAL      kidney stone         No family history on file. Social History     Social History    Marital status:      Spouse name: N/A    Number of children: N/A    Years of education: N/A     Occupational History    Not on file. Social History Main Topics    Smoking status: Current Every Day Smoker     Packs/day: 0.50     Years: 10.00    Smokeless tobacco: Never Used    Alcohol use No    Drug use: No    Sexual activity: Not on file     Other Topics Concern    Not on file     Social History Narrative         ALLERGIES: Codeine and Demerol [meperidine]    Review of Systems   Unable to perform ROS: Mental status change   Psychiatric/Behavioral: Positive for confusion. Vitals:    01/30/18 0210   BP: 98/56   Pulse: 87   Resp: 16   Temp: 98.7 °F (37.1 °C)   SpO2: 97%   Weight: 90.7 kg (200 lb)   Height: 6' (1.829 m)            Physical Exam   Constitutional: He is oriented to person, place, and time. He appears well-developed and well-nourished. HENT:   Mouth/Throat: Oropharynx is clear and moist. No oropharyngeal exudate.    Eyes: Conjunctivae and EOM are normal. Pupils are equal, round, and reactive to light. No scleral icterus. Neck: Full passive range of motion without pain. Neck supple. No rigidity. No Brudzinski's sign and no Kernig's sign noted. Cardiovascular: Normal rate, regular rhythm, normal heart sounds and intact distal pulses. Pulmonary/Chest: Effort normal and breath sounds normal.   Abdominal: Soft. Bowel sounds are normal. He exhibits no distension. There is no tenderness. There is no rebound and no guarding. Musculoskeletal: Normal range of motion. He exhibits no edema or tenderness. Lymphadenopathy:     He has no cervical adenopathy. Neurological: He is alert and oriented to person, place, and time. No cranial nerve deficit. He exhibits normal muscle tone. Coordination normal.   Skin: Skin is warm and dry. Nursing note and vitals reviewed. MDM  Number of Diagnoses or Management Options  Diagnosis management comments: Hepatic encephalopathy,delirium tremens benzo withdrawal.  Less likely meningitis/Encephalitis given lack of fever headache or neck stiffness. 3:14 AM  Patient has acute kidney injury. CT scan looks negative to me but official result pending. We will await results and admit to hospitalist for continued IV hydration and benzodiazepine administration for delirium tremens.        Amount and/or Complexity of Data Reviewed  Clinical lab tests: ordered and reviewed (Results for orders placed or performed during the hospital encounter of 01/30/18  -CBC WITH AUTOMATED DIFF       Result                                            Value                         Ref Range                       WBC                                               12.3 (H)                      4.3 - 11.1 K/uL                 RBC                                               4.66                          4.23 - 5.67 M/uL                HGB                                               15.3                          13.6 - 17.2 g/dL                HCT 44.3                          41.1 - 50.3 %                   MCV                                               95.1                          79.6 - 97.8 FL                  MCH                                               32.8                          26.1 - 32.9 PG                  MCHC                                              34.5                          31.4 - 35.0 g/dL                RDW                                               12.2                          11.9 - 14.6 %                   PLATELET                                          278                           150 - 450 K/uL                  MPV                                               11.3                          10.8 - 14.1 FL                  DF                                                AUTOMATED                                                     NEUTROPHILS                                       57                            43 - 78 %                       LYMPHOCYTES                                       33                            13 - 44 %                       MONOCYTES                                         9                             4.0 - 12.0 %                    EOSINOPHILS                                       1                             0.5 - 7.8 %                     BASOPHILS                                         0                             0.0 - 2.0 %                     IMMATURE GRANULOCYTES                             0                             0.0 - 5.0 %                     ABS. NEUTROPHILS                                  6.9                           1.7 - 8.2 K/UL                  ABS. LYMPHOCYTES                                  4.1                           0.5 - 4.6 K/UL                  ABS. MONOCYTES                                    1.2                           0.1 - 1.3 K/UL                  ABS.  EOSINOPHILS                                  0.1 0.0 - 0.8 K/UL                  ABS. BASOPHILS                                    0.0                           0.0 - 0.2 K/UL                  ABS. IMM. GRANS.                                  0.0                           0.0 - 0.5 K/UL             -METABOLIC PANEL, COMPREHENSIVE       Result                                            Value                         Ref Range                       Sodium                                            141                           136 - 145 mmol/L                Potassium                                         3.7                           3.5 - 5.1 mmol/L                Chloride                                          99                            98 - 107 mmol/L                 CO2                                               28                            21 - 32 mmol/L                  Anion gap                                         14                            7 - 16 mmol/L                   Glucose                                           105 (H)                       65 - 100 mg/dL                  BUN                                               35 (H)                        6 - 23 MG/DL                    Creatinine                                        2. 04 (H)                      0.8 - 1.5 MG/DL                 GFR est AA                                        45 (L)                        >60 ml/min/1.73m2               GFR est non-AA                                    37 (L)                        >60 ml/min/1.73m2               Calcium                                           9.5                           8.3 - 10.4 MG/DL                Bilirubin, total                                  0.8                           0.2 - 1.1 MG/DL                 ALT (SGPT)                                        73 (H)                        12 - 65 U/L                     AST (SGOT)                                        56 (H)                        15 - 37 U/L Alk. phosphatase                                  92                            50 - 136 U/L                    Protein, total                                    8.1                           6.3 - 8.2 g/dL                  Albumin                                           4.0                           3.5 - 5.0 g/dL                  Globulin                                          4.1 (H)                       2.3 - 3.5 g/dL                  A-G Ratio                                         1.0 (L)                       1.2 - 3.5                  -AMMONIA       Result                                            Value                         Ref Range                       Ammonia                                           <10 (L)                       11 - 32 UMOL/L             -MAGNESIUM       Result                                            Value                         Ref Range                       Magnesium                                         1.9                           1.8 - 2.4 mg/dL            -ETHYL ALCOHOL       Result                                            Value                         Ref Range                       ALCOHOL(ETHYL),SERUM                              1                             MG/DL                      )  Tests in the radiology section of CPT®: ordered and reviewed (Ct Head Wo Cont    Result Date: 1/30/2018  Noncontrast CT of the brain. COMPARISON: none INDICATION: Altered mental status. Fall. TECHNIQUE: Contiguous axial images were obtained from the skull base through the vertex without IV contrast. Radiation dose reduction techniques were used for this study:  Our CT scanners use one or all of the following: Automated exposure control, adjustment of the mA and/or kVp according to patient's size, iterative reconstruction. FINDINGS: There is no acute intracranial hemorrhage or evidence for acute territorial infarction.  There is no mass effect, midline shift or hydrocephalus. There is no extra-axial fluid collection. The cerebellum and brainstem are grossly unremarkable. Partially visualized paranasal sinuses and the mastoid air cells are aerated. There is no skull fracture.      IMPRESSION: No evidence of acute intracranial abnormality.    )      ED Course       Procedures

## 2018-01-30 NOTE — PROGRESS NOTES
TRANSFER - IN REPORT:    Verbal report received from [facility] on [address]  being received from ED for routine progression of care      Report consisted of patients Situation, Background, Assessment and   Recommendations(SBAR). Information from the following report(s) SBAR, ED Summary, [facility] and Recent Results was reviewed with the receiving nurse. Opportunity for questions and clarification was provided. Assessment completed upon patients arrival to unit and care assumed.

## 2018-01-30 NOTE — PROGRESS NOTES
Assessment complete via flow sheet. Pt alert, restless, nonsensical conversation. Respirations even and unlabored. S1 S2 auscultated. Pt on room air. FLACC score 0. Bowel sounds active, abdomen soft. Pt has has wrist restraints. Denies other needs. Bed in lowest position, side rails up 2, call bell in reach. Pt has sitter.

## 2018-01-30 NOTE — PROGRESS NOTES
Patient received from ED. Med/Surg. overflow. Patient has sitter at bedside. From [facility]. Patient with altered mental status, tremors,and hallucination. Patient keep trying to get out of bed. Reorient patient to surrounding without success. Dual skin assessment completed with Patient Aniket Bello. Small abrasion to bilateral shin with scab and multiple tattoos noted. Bed in low/lock position. Bed alarm on.

## 2018-01-30 NOTE — PROGRESS NOTES
Care Management Interventions  Mode of Transport at Discharge: Other (see comment)  Transition of Care Consult (CM Consult): Discharge Planning  Current Support Network: Other  Confirm Follow Up Transport: Other (see comment)  Discharge Location  Discharge Placement: Psychiatric Unit        Chart reviewed. Pt admitted from [facility]. SHAVON spoke with Minda Angel on unit 5 at [facility] who states pt is under involuntary commitment papers. Pt was admitted to [facility] on 1-22.

## 2018-01-30 NOTE — ED TRIAGE NOTES
Pt sent from [facility] for increased confusion and agitation, pt fell x 2 today, pt cooperative but agitated

## 2018-01-30 NOTE — H&P
Hospitalist H&P/Consult Note     Admit Date:  2018  2:03 AM   Name:  Douglas Flores   Age:  47 y.o.  :  1965   MRN:  [id number]   PCP:  Rosy Bowers MD  Treatment Team: Attending Provider: Sherron Fuentes MD    HPI:   48 y/o male with hx opiate use disorder, chronic use of xanax and previous recent use of Adderall is sent from [facility] for medical evaluation after patient had been having several falls at their facility as well as increasing confusion, agitation. He had been admitted there for treatment of depression and psychosis. He has no obvious injuries and a head CT is negative. He has no obvious source of infection. Is having delirium. Labs show new acute kidney injury with a Bun 35/Cr 2.04. This is up from 12/0.92 on the . Will admit for further evaluation and treatment. 10 systems reviewed and negative except as noted in HPI.patient cannot provide due to delirium  Past Medical History:   Diagnosis Date    Kidney stones     Psychiatric disorder     depression, psychosis       Past Surgical History:   Procedure Laterality Date    HX ORTHOPAEDIC      back x 5; right knee; left shoulder    HX UROLOGICAL      kidney stone      Prior to Admission Medications   Prescriptions Last Dose Informant Patient Reported? Taking? CLEMASTINE FUMARATE (VISTAMINE PO)   Yes Yes   Sig: Take  by mouth. MAG HYDROX/ALUMINUM HYD/SIMETH (MAALOX PLUS EXTRA STRENGTH PO)   Yes Yes   Sig: Take  by mouth. QUEtiapine (SEROQUEL) 50 mg tablet   Yes Yes   Sig: Take 50 mg by mouth two (2) times a day. aspirin delayed-release 81 mg tablet   Yes Yes   Sig: Take  by mouth daily. buprenorphine HCl (SUBUTEX) 2 mg sublingual tablet   Yes Yes   Si mg by SubLINGual route two (2) times a day. diphenhydrAMINE (BENADRYL) 25 mg capsule   Yes Yes   Sig: Take 50 mg by mouth every six (6) hours as needed. docusate sodium (COLACE) 100 mg capsule   Yes Yes   Sig: Take 100 mg by mouth two (2) times a day. haloperidol (HALDOL) 5 mg tablet   Yes Yes   Sig: Take 5 mg by mouth every four (4) hours.   haloperidol lactate (HALDOL) 5 mg/mL injection   Yes Yes   Sig: 10 mg by IntraMUSCular route every four (4) hours (while awake). hydrOXYzine pamoate (VISTARIL) 50 mg capsule   Yes Yes   Sig: Take 50 mg by mouth four (4) times daily as needed for Itching. ibuprofen (MOTRIN) 800 mg tablet   Yes Yes   Sig: Take  by mouth every eight (8) hours as needed for Pain. lisinopril (PRINIVIL, ZESTRIL) 10 mg tablet   Yes Yes   Sig: Take  by mouth daily. loperamide (IMMODIUM) 2 mg tablet   Yes Yes   Sig: Take 2 mg by mouth four (4) times daily as needed for Diarrhea.   melatonin 3 mg tablet   Yes Yes   Sig: Take 6 mg by mouth nightly as needed. sertraline (ZOLOFT) 50 mg tablet   No No   Sig: Take 1 Tab by mouth daily. Patient taking differently: Take 100 mg by mouth daily. Facility-Administered Medications: None     Allergies   Allergen Reactions    Codeine Itching and Swelling    Demerol [Meperidine] Other (comments)     Hallucinations      Social History   Substance Use Topics    Smoking status: Current Every Day Smoker     Packs/day: 0.50     Years: 10.00    Smokeless tobacco: Never Used    Alcohol use No      No family history on file. There is no immunization history on file for this patient. Objective:   Patient Vitals for the past 24 hrs:   Temp Pulse Resp BP SpO2   01/30/18 0413 97.2 °F (36.2 °C) 80 19 101/56 95 %   01/30/18 0406 98.6 °F (37 °C) 85 16 105/68 97 %   01/30/18 0307 - 85 16 105/68 97 %   01/30/18 0221 - - - 95/65 98 %   01/30/18 0210 98.7 °F (37.1 °C) 87 16 98/56 97 %     Oxygen Therapy  O2 Sat (%): 95 % (01/30/18 0413)  O2 Device: Room air (01/30/18 0413)  No intake or output data in the 24 hours ending 01/30/18 0450    Physical Exam:  General:    Well nourished. Alert. Confused, disoriented, disheveled with psychomotor agitation, pulling at his clothes.  No meningeal signs   Eyes: Normal sclera. Extraocular movements intact. ENT:  Normocephalic, atraumatic. Moist mucous membranes  CV:   RRR. No murmur, rub, or gallop. Lungs:  CTAB. No wheezing, rhonchi, or rales. Abdomen: Soft, nontender, nondistended. Bowel sounds normal.   Extremities: Warm and dry. No cyanosis or edema. Neurologic: CN II-XII grossly intact. Sensation intact. Skin:     No rashes or jaundice. No wounds. Psych:  Delirium, confusion, agitation    I reviewed the labs, imaging, EKGs, telemetry, and other studies done this admission. Data Review:   Recent Results (from the past 24 hour(s))   CBC WITH AUTOMATED DIFF    Collection Time: 01/30/18  2:07 AM   Result Value Ref Range    WBC 12.3 (H) 4.3 - 11.1 K/uL    RBC 4.66 4.23 - 5.67 M/uL    HGB 15.3 13.6 - 17.2 g/dL    HCT 44.3 41.1 - 50.3 %    MCV 95.1 79.6 - 97.8 FL    MCH 32.8 26.1 - 32.9 PG    MCHC 34.5 31.4 - 35.0 g/dL    RDW 12.2 11.9 - 14.6 %    PLATELET 372 032 - 634 K/uL    MPV 11.3 10.8 - 14.1 FL    DF AUTOMATED      NEUTROPHILS 57 43 - 78 %    LYMPHOCYTES 33 13 - 44 %    MONOCYTES 9 4.0 - 12.0 %    EOSINOPHILS 1 0.5 - 7.8 %    BASOPHILS 0 0.0 - 2.0 %    IMMATURE GRANULOCYTES 0 0.0 - 5.0 %    ABS. NEUTROPHILS 6.9 1.7 - 8.2 K/UL    ABS. LYMPHOCYTES 4.1 0.5 - 4.6 K/UL    ABS. MONOCYTES 1.2 0.1 - 1.3 K/UL    ABS. EOSINOPHILS 0.1 0.0 - 0.8 K/UL    ABS. BASOPHILS 0.0 0.0 - 0.2 K/UL    ABS. IMM.  GRANS. 0.0 0.0 - 0.5 K/UL   METABOLIC PANEL, COMPREHENSIVE    Collection Time: 01/30/18  2:07 AM   Result Value Ref Range    Sodium 141 136 - 145 mmol/L    Potassium 3.7 3.5 - 5.1 mmol/L    Chloride 99 98 - 107 mmol/L    CO2 28 21 - 32 mmol/L    Anion gap 14 7 - 16 mmol/L    Glucose 105 (H) 65 - 100 mg/dL    BUN 35 (H) 6 - 23 MG/DL    Creatinine 2.04 (H) 0.8 - 1.5 MG/DL    GFR est AA 45 (L) >60 ml/min/1.73m2    GFR est non-AA 37 (L) >60 ml/min/1.73m2    Calcium 9.5 8.3 - 10.4 MG/DL    Bilirubin, total 0.8 0.2 - 1.1 MG/DL    ALT (SGPT) 73 (H) 12 - 65 U/L    AST (SGOT) 56 (H) 15 - 37 U/L    Alk. phosphatase 92 50 - 136 U/L    Protein, total 8.1 6.3 - 8.2 g/dL    Albumin 4.0 3.5 - 5.0 g/dL    Globulin 4.1 (H) 2.3 - 3.5 g/dL    A-G Ratio 1.0 (L) 1.2 - 3.5     AMMONIA    Collection Time: 01/30/18  2:07 AM   Result Value Ref Range    Ammonia <10 (L) 11 - 32 UMOL/L   MAGNESIUM    Collection Time: 01/30/18  2:07 AM   Result Value Ref Range    Magnesium 1.9 1.8 - 2.4 mg/dL   ETHYL ALCOHOL    Collection Time: 01/30/18  2:07 AM   Result Value Ref Range    ALCOHOL(ETHYL),SERUM 1 MG/DL   CK    Collection Time: 01/30/18  2:07 AM   Result Value Ref Range     (H) 21 - 215 U/L   MYOGLOBIN    Collection Time: 01/30/18  2:07 AM   Result Value Ref Range    Myoglobin 1502 (H) 16 - 96 ng/mL   PHOSPHORUS    Collection Time: 01/30/18  2:07 AM   Result Value Ref Range    Phosphorus 5.4 (H) 2.5 - 4.5 MG/DL   TSH 3RD GENERATION    Collection Time: 01/30/18  2:07 AM   Result Value Ref Range    TSH 3.427 0.358 - 3.740 uIU/mL   DRUG SCREEN, URINE    Collection Time: 01/30/18  3:18 AM   Result Value Ref Range    PCP(PHENCYCLIDINE) NEGATIVE       BENZODIAZEPINES POSITIVE      COCAINE NEGATIVE       AMPHETAMINES NEGATIVE       METHADONE NEGATIVE       THC (TH-CANNABINOL) NEGATIVE       OPIATES NEGATIVE       BARBITURATES NEGATIVE          Imaging Studies:  CXR Results  (Last 48 hours)    None        CT Results  (Last 48 hours)               01/30/18 0240  CT HEAD WO CONT Final result    Impression:  IMPRESSION:       No evidence of acute intracranial abnormality. Narrative:  Noncontrast CT of the brain. COMPARISON: none       INDICATION: Altered mental status. Fall.        TECHNIQUE: Contiguous axial images were obtained from the skull base through the   vertex without IV contrast. Radiation dose reduction techniques were used for   this study:  Our CT scanners use one or all of the following: Automated exposure   control, adjustment of the mA and/or kVp according to patient's size, iterative reconstruction. FINDINGS:       There is no acute intracranial hemorrhage or evidence for acute territorial   infarction. There is no mass effect, midline shift or hydrocephalus. There is no   extra-axial fluid collection. The cerebellum and brainstem are grossly   unremarkable. Partially visualized paranasal sinuses and the mastoid air cells are aerated. There is no skull fracture. Assessment and Plan:     Hospital Problems as of 1/30/2018  Never Reviewed          Codes Class Noted - Resolved POA    * (Principal)JOSE (acute kidney injury) ([facility] ICD-10-CM: N17.9  ICD-9-CM: 584.9  1/30/2018 - Present Yes        Altered mental status ICD-10-CM: R41.82  ICD-9-CM: 780.97  1/30/2018 - Present Yes        Benzodiazepine withdrawal with delirium ([facility] ICD-10-CM: L26.567  ICD-9-CM: 292.0, 292.81, 304.10  1/30/2018 - Present Yes        Abnormal LFTs ICD-10-CM: R79.89  ICD-9-CM: 790.6  1/30/2018 - Present Yes        Major depression ICD-10-CM: F32.9  ICD-9-CM: 296.20  1/30/2018 - Present Yes              PLAN:  · Admit inpatient to medical bed  · Patient likely has delirium from acute benzodiazepine withdrawal  · He is also currently being treated at [facility] for depression and exhibits signs of psychosis  · Administer vigorous IV fluids for his acute kidney injury. With recent falls will check a CK and myoglobin level to assess for rhabdomyolysis. Check Mg, Phos  · Discontinue lisinopril and NSAIDS with his JOSE. Send UA  · Provide IV ativan. Continue his scheduled po haldol and seroquel. IM geodon if needed  · Will require a sitter for now. Keep on bedrest tonight with AMS and falls, when his mental state improves would recommend he be evaluated by physical therapy  · Avoid hepatotoxic and nephrotoxic medications. Check viral hepatitis screen  · Administer thiamine.  Check B12/folate levels and TSH  · SCDs for DVT prophylaxis  · Patient to return to [facility] when medically stable      Estimated LOS:  2 midnights    Signed:  Haley Sheppard MD

## 2018-01-30 NOTE — PROGRESS NOTES
TRANSFER - OUT REPORT:    Verbal report given to med surg rn on Radha Orlando  being transferred to [facility] for routine progression of care       Report consisted of patients Situation, Background, Assessment and   Recommendations(SBAR). Information from the following report(s) SBAR, ED Summary, MAR, Accordion and Recent Results was reviewed with the receiving nurse. Lines:   Peripheral IV 01/30/18 Right Antecubital (Active)   Site Assessment Clean, dry, & intact 1/30/2018  4:13 AM   Phlebitis Assessment 0 1/30/2018  4:13 AM   Infiltration Assessment 0 1/30/2018  4:13 AM   Dressing Status Clean, dry, & intact 1/30/2018  4:13 AM   Dressing Type Tape;Transparent 1/30/2018  4:13 AM   Hub Color/Line Status Green;Flushed; Infusing 1/30/2018  4:13 AM        Opportunity for questions and clarification was provided.       Patient transported with:   gumi

## 2018-01-30 NOTE — PROGRESS NOTES
Admission database incomplete. Patient with altered mental status,no family present. No previous admission. Will inform day shift.

## 2018-01-30 NOTE — PROGRESS NOTES
Patient restless trying to get out of bed. Soft wrist restraints applied. Talk with Dr. Azar Naranjo. Sitter at bedside. Bed in low/lock position. Bed alarm on.

## 2018-01-30 NOTE — PROGRESS NOTES
Problem: Dysphagia (Adult)  Goal: *Speech Goal: (INSERT TEXT)  Speech language pathology: bedside swallow note: Initial Assessment and Discharge    NAME/AGE/GENDER: Mason Diego is a 47 y.o. male  DATE: 1/30/2018  PRIMARY DIAGNOSIS: JOSE (acute kidney injury) ([address]  Altered mental status       ICD-10: Treatment Diagnosis: Dysphagia Other R13.19  INTERDISCIPLINARY COLLABORATION: Speech Therapist and Registered Nurse  PRECAUTIONS/ALLERGIES: Codeine and Demerol [meperidine] ASSESSMENT:Based on the objective data described below, Mr. Samuel Tidwell presents with a swallow function that is WNL's. Patient was confused. He was given trials of thin liquids via straw, puree and mixed. Patient would not take solids. No overt clinical s/sx of aspiration was observed. Swallow function is essentially WNL's however patient is confused. No further ST is warranted at this time. ?? PLAN OF CARE:   Patient will benefit from skilled intervention to address the following impairments. RECOMMENDATIONS AND PLANNED INTERVENTIONS (Benefits and precautions of therapy have been discussed with the patient.):  · PO:  Mechanical soft with chopped meat and vegetables  · Liquids:  regular thin  MEDICATIONS:  · With liquid  COMPENSATORY STRATEGIES/MODIFICATIONS INCLUDING:  · None  OTHER RECOMMENDATIONS (including follow up treatment recommendations):   · none  RECOMMENDED DIET MODIFICATIONS DISCUSSED WITH:  · Nursing  · Patient  FREQUENCY/DURATION: Evaluation onlyRECOMMENDED REHABILITATION/EQUIPMENT: (at time of discharge pending progress): Due to the probability of continued deficits (see above) this patient will not likely need continued skilled speech therapy after discharge. SUBJECTIVE:   Alert but confused  History of Present Injury/Illness: Mr. Samuel Tidwell  has a past medical history of Kidney stones and Psychiatric disorder. He also has no past medical history of Aneurysm ([address] Arrhythmia; Arthritis; Asthma;  Autoimmune disease ([address] CAD (coronary artery disease); Cancer ([facility] Chronic kidney disease; Chronic pain; Coagulation defects; COPD; Diabetes ([facility] Difficult intubation; GERD (gastroesophageal reflux disease); Heart failure ([facility] Hypertension; Liver disease; Malignant hyperthermia due to anesthesia; Morbid obesity ([facility] Nausea & vomiting; Other ill-defined conditions(799.89); Pseudocholinesterase deficiency; PUD (peptic ulcer disease); Seizures ([facility] Stroke [facility]); Thromboembolus ([facility] Thyroid disease; or Unspecified sleep apnea. .  He also  has a past surgical history that includes hx urological and hx orthopaedic. Present Symptoms:    Pain Intensity 1: 0  Current Medications:   No current facility-administered medications on file prior to encounter. Current Outpatient Prescriptions on File Prior to Encounter   Medication Sig Dispense Refill    sertraline (ZOLOFT) 50 mg tablet Take 1 Tab by mouth daily. (Patient taking differently: Take 100 mg by mouth daily. ) 14 Tab 0     Current Dietary Status:  Regular/thin      History of reflux:  NO    Reflux medication:N/A  Social History/Home Situation: unknown      OBJECTIVE:   Respiratory Status:  Room air     CXR Results:N/A  MRI/CT Results:N/A  Oral Motor Structure/Speech:  Oral-Motor Structure/Motor Speech  Labial: No impairment  Dentition: Natural  Oral Hygiene: good  Lingual: No impairment  Velum: No impairment  Mandible: No impairment    Cognitive and Communication Status:  Neurologic State: Confused  Orientation Level: Oriented to person;Disoriented to time;Disoriented to situation;Disoriented to person  Cognition: Poor safety awareness;Decreased attention/concentration  Perception: Appears intact  Perseveration: No perseveration noted  Safety/Judgement: Not assessed    BEDSIDE SWALLOW EVALUATION  Oral Assessment:  Oral Assessment  Labial: No impairment  Dentition: Natural  Oral Hygiene: good  Lingual: No impairment  Velum: No impairment  Mandible: No impairment  Gag Reflex: Present  P.O. Trials:  Patient Position: upright in bed    The patient was given teaspoon to tablespoon   amounts of the following:   Consistency Presented: Thin liquid; Solid;Puree;Mixed consistency  How Presented: SLP-fed/presented;Straw;Spoon; Successive swallows    ORAL PHASE:  Bolus Acceptance: No impairment  Bolus Formation/Control: No impairment  Propulsion: No impairment     Oral Residue: None    PHARYNGEAL PHASE:  Initiation of Swallow: No impairment  Laryngeal Elevation: Functional  Aspiration Signs/Symptoms: None  Vocal Quality: No impairment           Pharyngeal Phase Characteristics: No impairment, issues, or problems     OTHER OBSERVATIONS:  Rate/bite size: Questionable   Endurance: WNL   Comments: Tool Used: Dysphagia Outcome and Severity Scale (ANUPAMA)    Score Comments   Normal Diet  [x] 7 With no strategies or extra time needed   Functional Swallow  [] 6 May have mild oral or pharyngeal delay       Mild Dysphagia    [] 5 Which may require one diet consistency restricted (those who demonstrate penetration which is entirely cleared on MBS would be included)   Mild-Moderate Dysphagia  [] 4 With 1-2 diet consistencies restricted       Moderate Dysphagia  [] 3 With 2 or more diet consistencies restricted       Moderately Severe Dysphagia  [] 2 With partial PO strategies (trials with ST only)       Severe Dysphagia  [] 1 With inability to tolerate any PO safely          Score:  Initial: 7 Most Recent: X (Date: -- )   Interpretation of Tool: The Dysphagia Outcome and Severity Scale (ANUPAMA) is a simple, easy-to-use, 7-point scale developed to systematically rate the functional severity of dysphagia based on objective assessment and make recommendations for diet level, independence level, and type of nutrition. Score 7 6 5 4 3 2 1   Modifier CH CI CJ CK CL CM CN   ?  Swallowing:     - CURRENT STATUS: CH - 0% impaired, limited or restricted    - GOAL STATUS:  CH - 0% impaired, limited or restricted    - D/C STATUS:  CH - 0% impaired, limited or restricted  Payor: Saudjosep Gwyn / Plan: [address] [place] [address] HMO / Product Type: Managed Care Medicare /     TREATMENT:    (In addition to Assessment/Re-Assessment sessions the following treatments were rendered)  Assessment/Reassessment only, no treatment provided today  MODALITIES:                                                                    ORAL MOTOR  EXERCISES:                                                                                                                                                                      LARYNGEAL / PHARYNGEAL EXERCISES:                                                                                                                                     __________________________________________________________________________________________________  Safety:   After treatment position/precautions:  · Upright in Bed  Treatment Assessment:  Dysphagia evaluation. No further ST is warranted. Total Treatment Duration:  Time In: 0830  Time Out: DONYA Ervin

## 2018-01-30 NOTE — ED NOTES
TRANSFER - OUT REPORT:    Verbal report given to Melani Martinez RN on Hipolito Russo  being transferred to 375 (overflow)for routine progression of care       Report consisted of patients Situation, Background, Assessment and   Recommendations(SBAR). Information from the following report(s) ED Summary was reviewed with the receiving nurse. Lines:   Peripheral IV 01/30/18 Right Antecubital (Active)   Site Assessment Clean, dry, & intact 1/30/2018  2:10 AM   Phlebitis Assessment 0 1/30/2018  2:10 AM   Infiltration Assessment 0 1/30/2018  2:10 AM   Dressing Status Clean, dry, & intact 1/30/2018  2:10 AM        Opportunity for questions and clarification was provided.       Patient transported with:   Registered Nurse

## 2018-01-30 NOTE — PROGRESS NOTES
TRANSFER - IN REPORT:    Verbal report received from Kinsey(name) on Marv Books  being received from ICU(unit) for routine progression of care      Report consisted of patients Situation, Background, Assessment and   Recommendations(SBAR). Information from the following report(s) SBAR, Procedure Summary and Intake/Output was reviewed with the receiving nurse. Opportunity for questions and clarification was provided. Assessment completed upon patients arrival to unit and care assumed.

## 2018-01-31 LAB
ALBUMIN SERPL-MCNC: 3.2 G/DL (ref 3.5–5)
ALBUMIN/GLOB SERPL: 0.9 {RATIO} (ref 1.2–3.5)
ALP SERPL-CCNC: 78 U/L (ref 50–136)
ALT SERPL-CCNC: 61 U/L (ref 12–65)
ANION GAP SERPL CALC-SCNC: 8 MMOL/L (ref 7–16)
AST SERPL-CCNC: 66 U/L (ref 15–37)
ATRIAL RATE: 73 BPM
BILIRUB SERPL-MCNC: 0.9 MG/DL (ref 0.2–1.1)
BUN SERPL-MCNC: 24 MG/DL (ref 6–23)
CALCIUM SERPL-MCNC: 8.1 MG/DL (ref 8.3–10.4)
CALCULATED P AXIS, ECG09: 69 DEGREES
CALCULATED R AXIS, ECG10: 22 DEGREES
CALCULATED T AXIS, ECG11: 31 DEGREES
CHLORIDE SERPL-SCNC: 108 MMOL/L (ref 98–107)
CK SERPL-CCNC: 449 U/L (ref 21–215)
CO2 SERPL-SCNC: 27 MMOL/L (ref 21–32)
CREAT SERPL-MCNC: 0.96 MG/DL (ref 0.8–1.5)
DIAGNOSIS, 93000: NORMAL
ERYTHROCYTE [DISTWIDTH] IN BLOOD BY AUTOMATED COUNT: 11.9 % (ref 11.9–14.6)
GLOBULIN SER CALC-MCNC: 3.4 G/DL (ref 2.3–3.5)
GLUCOSE SERPL-MCNC: 81 MG/DL (ref 65–100)
HAV IGM SERPL QL IA: NEGATIVE
HBV CORE IGM SERPL QL IA: NEGATIVE
HBV SURFACE AG SERPL QL IA: NEGATIVE
HCT VFR BLD AUTO: 38.6 % (ref 41.1–50.3)
HCV AB S/CO SERPL IA: >11 S/CO RATIO (ref 0–0.9)
HGB BLD-MCNC: 12.3 G/DL (ref 13.6–17.2)
MCH RBC QN AUTO: 29.9 PG (ref 26.1–32.9)
MCHC RBC AUTO-ENTMCNC: 31.9 G/DL (ref 31.4–35)
MCV RBC AUTO: 93.9 FL (ref 79.6–97.8)
P-R INTERVAL, ECG05: 146 MS
PLATELET # BLD AUTO: 209 K/UL (ref 150–450)
PMV BLD AUTO: 11.4 FL (ref 10.8–14.1)
POTASSIUM SERPL-SCNC: 3.7 MMOL/L (ref 3.5–5.1)
PROT SERPL-MCNC: 6.6 G/DL (ref 6.3–8.2)
Q-T INTERVAL, ECG07: 402 MS
QRS DURATION, ECG06: 92 MS
QTC CALCULATION (BEZET), ECG08: 442 MS
RBC # BLD AUTO: 4.11 M/UL (ref 4.23–5.67)
SODIUM SERPL-SCNC: 143 MMOL/L (ref 136–145)
VENTRICULAR RATE, ECG03: 73 BPM
WBC # BLD AUTO: 10 K/UL (ref 4.3–11.1)

## 2018-01-31 PROCEDURE — 65270000029 HC RM PRIVATE

## 2018-01-31 PROCEDURE — 74011250637 HC RX REV CODE- 250/637: Performed by: HOSPITALIST

## 2018-01-31 PROCEDURE — 80053 COMPREHEN METABOLIC PANEL: CPT | Performed by: HOSPITALIST

## 2018-01-31 PROCEDURE — 36415 COLL VENOUS BLD VENIPUNCTURE: CPT | Performed by: HOSPITALIST

## 2018-01-31 PROCEDURE — 74011000258 HC RX REV CODE- 258: Performed by: INTERNAL MEDICINE

## 2018-01-31 PROCEDURE — 74011250636 HC RX REV CODE- 250/636: Performed by: HOSPITALIST

## 2018-01-31 PROCEDURE — 74011000250 HC RX REV CODE- 250: Performed by: HOSPITALIST

## 2018-01-31 PROCEDURE — 74011250637 HC RX REV CODE- 250/637: Performed by: INTERNAL MEDICINE

## 2018-01-31 PROCEDURE — 82550 ASSAY OF CK (CPK): CPT | Performed by: HOSPITALIST

## 2018-01-31 PROCEDURE — 74011250636 HC RX REV CODE- 250/636: Performed by: INTERNAL MEDICINE

## 2018-01-31 PROCEDURE — 85027 COMPLETE CBC AUTOMATED: CPT | Performed by: HOSPITALIST

## 2018-01-31 PROCEDURE — 74011000258 HC RX REV CODE- 258: Performed by: HOSPITALIST

## 2018-01-31 RX ORDER — BUPRENORPHINE 2 MG/1
1 TABLET SUBLINGUAL 2 TIMES DAILY
Status: DISCONTINUED | OUTPATIENT
Start: 2018-01-31 | End: 2018-02-04 | Stop reason: HOSPADM

## 2018-01-31 RX ORDER — QUETIAPINE FUMARATE 25 MG/1
25 TABLET, FILM COATED ORAL 2 TIMES DAILY
Status: DISCONTINUED | OUTPATIENT
Start: 2018-01-31 | End: 2018-02-04 | Stop reason: HOSPADM

## 2018-01-31 RX ORDER — DEXTROSE MONOHYDRATE AND SODIUM CHLORIDE 5; .9 G/100ML; G/100ML
100 INJECTION, SOLUTION INTRAVENOUS CONTINUOUS
Status: DISCONTINUED | OUTPATIENT
Start: 2018-01-31 | End: 2018-02-03

## 2018-01-31 RX ADMIN — QUETIAPINE FUMARATE 50 MG: 25 TABLET ORAL at 11:29

## 2018-01-31 RX ADMIN — THIAMINE HYDROCHLORIDE 100 MG: 100 INJECTION, SOLUTION INTRAMUSCULAR; INTRAVENOUS at 11:44

## 2018-01-31 RX ADMIN — WATER 10 MG: 1 INJECTION INTRAMUSCULAR; INTRAVENOUS; SUBCUTANEOUS at 03:23

## 2018-01-31 RX ADMIN — ASPIRIN 81 MG: 81 TABLET, COATED ORAL at 11:29

## 2018-01-31 RX ADMIN — LORAZEPAM 2 MG: 2 INJECTION INTRAMUSCULAR; INTRAVENOUS at 00:36

## 2018-01-31 RX ADMIN — SERTRALINE HYDROCHLORIDE 100 MG: 100 TABLET ORAL at 09:00

## 2018-01-31 RX ADMIN — DEXTROSE MONOHYDRATE AND SODIUM CHLORIDE 100 ML/HR: 5; .9 INJECTION, SOLUTION INTRAVENOUS at 21:25

## 2018-01-31 RX ADMIN — BUPRENORPHINE HYDROCHLORIDE SUBLINGUAL 1 MG: 2 TABLET SUBLINGUAL at 21:25

## 2018-01-31 RX ADMIN — SODIUM CHLORIDE 150 ML/HR: 900 INJECTION, SOLUTION INTRAVENOUS at 08:00

## 2018-01-31 RX ADMIN — QUETIAPINE FUMARATE 25 MG: 25 TABLET, FILM COATED ORAL at 21:26

## 2018-01-31 RX ADMIN — DOCUSATE SODIUM 100 MG: 100 CAPSULE, LIQUID FILLED ORAL at 11:29

## 2018-01-31 RX ADMIN — BUPRENORPHINE HYDROCHLORIDE SUBLINGUAL 2 MG: 2 TABLET SUBLINGUAL at 11:29

## 2018-01-31 NOTE — PROGRESS NOTES
Patient restless, agitated. Haldol 2 mg given slow IVP. Bilateral wrist restraints in place. Sitter at bedside. Will monitor.

## 2018-01-31 NOTE — PROGRESS NOTES
Geodon effective, patient resting quietly at this time. No distress noted. High patent and draining. Bilateral wrist restraints in place. Sitter at bedside.

## 2018-01-31 NOTE — PROGRESS NOTES
Seen at bedside this morning. Still confused on 4 pts restrains. Hemodynamically stable. COntinue IV prn haldol and ativan.  IV fluids

## 2018-01-31 NOTE — PROGRESS NOTES
PM assessment complete. Patient alert, oriented to name only. Respirations even and unlabored, no distress noted. Abdomen soft, bowel sounds active in all 4 quadrants. Bilateral wrist restraints in place. Sitter at bedside. Will monitor.

## 2018-01-31 NOTE — PROGRESS NOTES
Problem: Interdisciplinary Rounds  Goal: Interdisciplinary Rounds  Interdisciplinary team rounds were held 1/31/2018 with the following team members:Care Management, Nursing, Nutrition, Pharmacy and Physician and the patient was not able to participate. Plan of care discussed. See clinical pathway and/or care plan for interventions and desired outcomes.

## 2018-01-31 NOTE — PROGRESS NOTES
Attempted to give patient medication. He is asleep and refuses to wake to take medications. Continue to monitor.

## 2018-02-01 LAB
ALBUMIN SERPL-MCNC: 3.2 G/DL (ref 3.5–5)
ALBUMIN/GLOB SERPL: 0.8 {RATIO} (ref 1.2–3.5)
ALP SERPL-CCNC: 85 U/L (ref 50–136)
ALT SERPL-CCNC: 55 U/L (ref 12–65)
ANION GAP SERPL CALC-SCNC: 11 MMOL/L (ref 7–16)
AST SERPL-CCNC: 57 U/L (ref 15–37)
BILIRUB SERPL-MCNC: 1 MG/DL (ref 0.2–1.1)
BUN SERPL-MCNC: 21 MG/DL (ref 6–23)
CALCIUM SERPL-MCNC: 8.6 MG/DL (ref 8.3–10.4)
CHLORIDE SERPL-SCNC: 105 MMOL/L (ref 98–107)
CO2 SERPL-SCNC: 25 MMOL/L (ref 21–32)
CREAT SERPL-MCNC: 0.83 MG/DL (ref 0.8–1.5)
ERYTHROCYTE [DISTWIDTH] IN BLOOD BY AUTOMATED COUNT: 11.8 % (ref 11.9–14.6)
FLUAV AG NPH QL IA: NEGATIVE
FLUBV AG NPH QL IA: NEGATIVE
GLOBULIN SER CALC-MCNC: 3.9 G/DL (ref 2.3–3.5)
GLUCOSE SERPL-MCNC: 114 MG/DL (ref 65–100)
HCT VFR BLD AUTO: 39.7 % (ref 41.1–50.3)
HGB BLD-MCNC: 13.8 G/DL (ref 13.6–17.2)
MCH RBC QN AUTO: 32.2 PG (ref 26.1–32.9)
MCHC RBC AUTO-ENTMCNC: 34.8 G/DL (ref 31.4–35)
MCV RBC AUTO: 92.5 FL (ref 79.6–97.8)
PLATELET # BLD AUTO: 230 K/UL (ref 150–450)
PMV BLD AUTO: 11.4 FL (ref 10.8–14.1)
POTASSIUM SERPL-SCNC: 4 MMOL/L (ref 3.5–5.1)
PROT SERPL-MCNC: 7.1 G/DL (ref 6.3–8.2)
RBC # BLD AUTO: 4.29 M/UL (ref 4.23–5.67)
SODIUM SERPL-SCNC: 141 MMOL/L (ref 136–145)
WBC # BLD AUTO: 11.7 K/UL (ref 4.3–11.1)

## 2018-02-01 PROCEDURE — 74011000258 HC RX REV CODE- 258: Performed by: INTERNAL MEDICINE

## 2018-02-01 PROCEDURE — 74011250636 HC RX REV CODE- 250/636: Performed by: HOSPITALIST

## 2018-02-01 PROCEDURE — 85027 COMPLETE CBC AUTOMATED: CPT | Performed by: INTERNAL MEDICINE

## 2018-02-01 PROCEDURE — 74011250636 HC RX REV CODE- 250/636: Performed by: INTERNAL MEDICINE

## 2018-02-01 PROCEDURE — 74011250637 HC RX REV CODE- 250/637: Performed by: HOSPITALIST

## 2018-02-01 PROCEDURE — 87804 INFLUENZA ASSAY W/OPTIC: CPT | Performed by: HOSPITALIST

## 2018-02-01 PROCEDURE — 74011000258 HC RX REV CODE- 258: Performed by: HOSPITALIST

## 2018-02-01 PROCEDURE — 80053 COMPREHEN METABOLIC PANEL: CPT | Performed by: HOSPITALIST

## 2018-02-01 PROCEDURE — 74011000250 HC RX REV CODE- 250: Performed by: INTERNAL MEDICINE

## 2018-02-01 PROCEDURE — 36415 COLL VENOUS BLD VENIPUNCTURE: CPT | Performed by: HOSPITALIST

## 2018-02-01 PROCEDURE — 65270000029 HC RM PRIVATE

## 2018-02-01 PROCEDURE — 74011250637 HC RX REV CODE- 250/637: Performed by: INTERNAL MEDICINE

## 2018-02-01 RX ADMIN — CLINDAMYCIN PHOSPHATE 600 MG: 150 INJECTION, SOLUTION INTRAVENOUS at 16:23

## 2018-02-01 RX ADMIN — DOCUSATE SODIUM 100 MG: 100 CAPSULE, LIQUID FILLED ORAL at 16:24

## 2018-02-01 RX ADMIN — Medication 10 ML: at 17:24

## 2018-02-01 RX ADMIN — DOCUSATE SODIUM 100 MG: 100 CAPSULE, LIQUID FILLED ORAL at 09:00

## 2018-02-01 RX ADMIN — BUPRENORPHINE HYDROCHLORIDE SUBLINGUAL 1 MG: 2 TABLET SUBLINGUAL at 20:19

## 2018-02-01 RX ADMIN — DEXTROSE MONOHYDRATE AND SODIUM CHLORIDE 100 ML/HR: 5; .9 INJECTION, SOLUTION INTRAVENOUS at 16:24

## 2018-02-01 RX ADMIN — BUPRENORPHINE HYDROCHLORIDE SUBLINGUAL 1 MG: 2 TABLET SUBLINGUAL at 09:00

## 2018-02-01 RX ADMIN — ASPIRIN 81 MG: 81 TABLET, COATED ORAL at 09:00

## 2018-02-01 RX ADMIN — DEXTROSE MONOHYDRATE AND SODIUM CHLORIDE 100 ML/HR: 5; .9 INJECTION, SOLUTION INTRAVENOUS at 09:03

## 2018-02-01 RX ADMIN — QUETIAPINE FUMARATE 25 MG: 25 TABLET, FILM COATED ORAL at 09:00

## 2018-02-01 RX ADMIN — LORAZEPAM 2 MG: 2 INJECTION INTRAMUSCULAR; INTRAVENOUS at 19:26

## 2018-02-01 RX ADMIN — QUETIAPINE FUMARATE 25 MG: 25 TABLET, FILM COATED ORAL at 20:19

## 2018-02-01 RX ADMIN — SERTRALINE HYDROCHLORIDE 100 MG: 100 TABLET ORAL at 09:01

## 2018-02-01 RX ADMIN — THIAMINE HYDROCHLORIDE 100 MG: 100 INJECTION, SOLUTION INTRAMUSCULAR; INTRAVENOUS at 09:04

## 2018-02-01 RX ADMIN — Medication 10 ML: at 22:00

## 2018-02-01 NOTE — PROGRESS NOTES
Am assessment completed. Pt is alert and oriented x 3, lungs diminished. When patient is awake, he does expres needs. Has slept x 2 days. Sitter at bedside. Continue to monitor.

## 2018-02-01 NOTE — PROGRESS NOTES
Progress Note    Patient: Kavin Ron MRN: [id number]  SSN: [id number]    YOB: 1965  Age: 47 y.o. Sex: male      Admit Date: 1/30/2018    LOS: 1 day     Subjective:     Patient was seen at bedside. Still confused. Has remained sleeping majority of the day. Poor oral intake. Will decrease dose of oral psychotropic meds. Will monitor his VS.     Objective:     Vitals:    01/31/18 0302 01/31/18 0629 01/31/18 1129 01/31/18 1433   BP: (!) 150/95 (!) 168/96 132/78 163/80   Pulse: 86 78 70 63   Resp: 18 18 18 17   Temp: 99.4 °F (37.4 °C) 99.5 °F (37.5 °C) 99 °F (37.2 °C) 98.9 °F (37.2 °C)   SpO2: 95% 95% 95% 96%   Weight:       Height:            Intake and Output:  Current Shift:    Last three shifts: 01/30 0701 - 01/31 1900  In: 3826 [I.V.:3826]  Out: 2625 [Urine:2625]    Physical Exam:   GENERAL: drowsy   EYE: conjunctivae/corneas clear. PERRL, EOM's intact. Fundi benign  LYMPHATIC: Cervical, supraclavicular, and axillary nodes normal.   THROAT & NECK: normal and no erythema or exudates noted. LUNG: clear to auscultation bilaterally  HEART: regular rate and rhythm, S1, S2 normal, no murmur, click, rub or gallop  ABDOMEN: soft, non-tender. Bowel sounds normal. No masses,  no organomegaly  EXTREMITIES:  extremities normal, atraumatic, no cyanosis or edema  SKIN: Normal.  NEUROLOGIC: non focal   PSYCHIATRIC: unable to be assessed. Lab/Data Review:  Lab results reviewed. For significant abnormal values and values requiring intervention, see assessment and plan.          Assessment:     Principal Problem:    JOSE (acute kidney injury) ([facility] (1/30/2018)    Active Problems:    Altered mental status (1/30/2018)      Abnormal LFTs (1/30/2018)      Major depression (1/30/2018)      Benzodiazepine withdrawal with delirium ([address] (1/30/2018)        Plan:     -continue IV fluids   -decrease dose of oral psychotropic meds   -Renal function back to normal   -CK improving   -monitor VS   -sitter next to him     Signed By: Angelica Diaz MD     January 31, 2018

## 2018-02-01 NOTE — PROGRESS NOTES
PM assessment complete. Patient alert. Respirations even and unlabored, no distress noted. Abdomen soft, bowel sounds active in all 4 quadrants. High patent and draining. IVF infusing without difficulty. Bilateral soft wrist restraints in place. Sitter at bedside.

## 2018-02-01 NOTE — PROGRESS NOTES
Problem: Interdisciplinary Rounds  Goal: Interdisciplinary Rounds  Interdisciplinary team rounds were held 2/1/2018 with the following team members:Care Management, Nursing, Nutrition, Pharmacy and Physician. Plan of care discussed. See clinical pathway and/or care plan for interventions and desired outcomes.

## 2018-02-02 ENCOUNTER — APPOINTMENT (OUTPATIENT)
Dept: GENERAL RADIOLOGY | Age: 53
DRG: 896 | End: 2018-02-02
Attending: INTERNAL MEDICINE
Payer: MEDICARE

## 2018-02-02 PROBLEM — G93.41 METABOLIC ENCEPHALOPATHY: Status: ACTIVE | Noted: 2018-02-02

## 2018-02-02 LAB
ALBUMIN SERPL-MCNC: 2.9 G/DL (ref 3.5–5)
ALBUMIN/GLOB SERPL: 0.8 {RATIO} (ref 1.2–3.5)
ALP SERPL-CCNC: 79 U/L (ref 50–136)
ALT SERPL-CCNC: 45 U/L (ref 12–65)
ANION GAP SERPL CALC-SCNC: 10 MMOL/L (ref 7–16)
AST SERPL-CCNC: 43 U/L (ref 15–37)
BILIRUB SERPL-MCNC: 0.9 MG/DL (ref 0.2–1.1)
BUN SERPL-MCNC: 15 MG/DL (ref 6–23)
CALCIUM SERPL-MCNC: 7.9 MG/DL (ref 8.3–10.4)
CHLORIDE SERPL-SCNC: 104 MMOL/L (ref 98–107)
CO2 SERPL-SCNC: 26 MMOL/L (ref 21–32)
CREAT SERPL-MCNC: 0.68 MG/DL (ref 0.8–1.5)
ERYTHROCYTE [DISTWIDTH] IN BLOOD BY AUTOMATED COUNT: 11.7 % (ref 11.9–14.6)
GLOBULIN SER CALC-MCNC: 3.7 G/DL (ref 2.3–3.5)
GLUCOSE SERPL-MCNC: 128 MG/DL (ref 65–100)
HCT VFR BLD AUTO: 38.3 % (ref 41.1–50.3)
HGB BLD-MCNC: 13.6 G/DL (ref 13.6–17.2)
MCH RBC QN AUTO: 32.6 PG (ref 26.1–32.9)
MCHC RBC AUTO-ENTMCNC: 35.5 G/DL (ref 31.4–35)
MCV RBC AUTO: 91.8 FL (ref 79.6–97.8)
PLATELET # BLD AUTO: 202 K/UL (ref 150–450)
PMV BLD AUTO: 11.1 FL (ref 10.8–14.1)
POTASSIUM SERPL-SCNC: 3.4 MMOL/L (ref 3.5–5.1)
PROT SERPL-MCNC: 6.6 G/DL (ref 6.3–8.2)
RBC # BLD AUTO: 4.17 M/UL (ref 4.23–5.67)
SODIUM SERPL-SCNC: 140 MMOL/L (ref 136–145)
WBC # BLD AUTO: 10.8 K/UL (ref 4.3–11.1)

## 2018-02-02 PROCEDURE — 71045 X-RAY EXAM CHEST 1 VIEW: CPT

## 2018-02-02 PROCEDURE — 51798 US URINE CAPACITY MEASURE: CPT

## 2018-02-02 PROCEDURE — 74011250637 HC RX REV CODE- 250/637: Performed by: INTERNAL MEDICINE

## 2018-02-02 PROCEDURE — 85027 COMPLETE CBC AUTOMATED: CPT | Performed by: HOSPITALIST

## 2018-02-02 PROCEDURE — 74011000258 HC RX REV CODE- 258: Performed by: HOSPITALIST

## 2018-02-02 PROCEDURE — 74011250637 HC RX REV CODE- 250/637: Performed by: HOSPITALIST

## 2018-02-02 PROCEDURE — 74011250636 HC RX REV CODE- 250/636: Performed by: HOSPITALIST

## 2018-02-02 PROCEDURE — 36415 COLL VENOUS BLD VENIPUNCTURE: CPT | Performed by: HOSPITALIST

## 2018-02-02 PROCEDURE — 74011250636 HC RX REV CODE- 250/636: Performed by: INTERNAL MEDICINE

## 2018-02-02 PROCEDURE — 74011000258 HC RX REV CODE- 258: Performed by: INTERNAL MEDICINE

## 2018-02-02 PROCEDURE — 74011000250 HC RX REV CODE- 250: Performed by: INTERNAL MEDICINE

## 2018-02-02 PROCEDURE — 80053 COMPREHEN METABOLIC PANEL: CPT | Performed by: HOSPITALIST

## 2018-02-02 PROCEDURE — 65270000029 HC RM PRIVATE

## 2018-02-02 RX ORDER — POTASSIUM CHLORIDE 14.9 MG/ML
20 INJECTION INTRAVENOUS ONCE
Status: COMPLETED | OUTPATIENT
Start: 2018-02-02 | End: 2018-02-02

## 2018-02-02 RX ORDER — LANOLIN ALCOHOL/MO/W.PET/CERES
100 CREAM (GRAM) TOPICAL DAILY
Status: DISCONTINUED | OUTPATIENT
Start: 2018-02-03 | End: 2018-02-04 | Stop reason: HOSPADM

## 2018-02-02 RX ORDER — AMOXICILLIN 500 MG/1
500 CAPSULE ORAL EVERY 8 HOURS
Status: DISCONTINUED | OUTPATIENT
Start: 2018-02-02 | End: 2018-02-04 | Stop reason: HOSPADM

## 2018-02-02 RX ADMIN — AMOXICILLIN 500 MG: 500 CAPSULE ORAL at 17:55

## 2018-02-02 RX ADMIN — SERTRALINE HYDROCHLORIDE 100 MG: 100 TABLET ORAL at 09:00

## 2018-02-02 RX ADMIN — ASPIRIN 81 MG: 81 TABLET, COATED ORAL at 09:00

## 2018-02-02 RX ADMIN — CLINDAMYCIN PHOSPHATE 600 MG: 150 INJECTION, SOLUTION INTRAVENOUS at 01:33

## 2018-02-02 RX ADMIN — THIAMINE HYDROCHLORIDE 100 MG: 100 INJECTION, SOLUTION INTRAMUSCULAR; INTRAVENOUS at 09:29

## 2018-02-02 RX ADMIN — Medication 10 ML: at 13:02

## 2018-02-02 RX ADMIN — DEXTROSE MONOHYDRATE AND SODIUM CHLORIDE 100 ML/HR: 5; .9 INJECTION, SOLUTION INTRAVENOUS at 22:59

## 2018-02-02 RX ADMIN — Medication 10 ML: at 01:36

## 2018-02-02 RX ADMIN — DOCUSATE SODIUM 100 MG: 100 CAPSULE, LIQUID FILLED ORAL at 17:55

## 2018-02-02 RX ADMIN — DEXTROSE MONOHYDRATE AND SODIUM CHLORIDE 100 ML/HR: 5; .9 INJECTION, SOLUTION INTRAVENOUS at 01:35

## 2018-02-02 RX ADMIN — QUETIAPINE FUMARATE 25 MG: 25 TABLET, FILM COATED ORAL at 20:37

## 2018-02-02 RX ADMIN — POTASSIUM CHLORIDE 20 MEQ: 200 INJECTION, SOLUTION INTRAVENOUS at 13:01

## 2018-02-02 RX ADMIN — Medication 10 ML: at 17:55

## 2018-02-02 RX ADMIN — CLINDAMYCIN PHOSPHATE 600 MG: 150 INJECTION, SOLUTION INTRAVENOUS at 09:29

## 2018-02-02 RX ADMIN — BUPRENORPHINE HYDROCHLORIDE SUBLINGUAL 1 MG: 2 TABLET SUBLINGUAL at 09:29

## 2018-02-02 RX ADMIN — HALOPERIDOL LACTATE 2 MG: 5 INJECTION, SOLUTION INTRAMUSCULAR at 21:21

## 2018-02-02 RX ADMIN — AMOXICILLIN 500 MG: 500 CAPSULE ORAL at 21:21

## 2018-02-02 RX ADMIN — QUETIAPINE FUMARATE 25 MG: 25 TABLET, FILM COATED ORAL at 09:00

## 2018-02-02 RX ADMIN — DOCUSATE SODIUM 100 MG: 100 CAPSULE, LIQUID FILLED ORAL at 09:00

## 2018-02-02 RX ADMIN — BUPRENORPHINE HYDROCHLORIDE SUBLINGUAL 1 MG: 2 TABLET SUBLINGUAL at 20:37

## 2018-02-02 NOTE — PROGRESS NOTES
Progress Note    Patient: Maylin Montana MRN: [id number]  SSN: [id number]    YOB: 1965  Age: 47 y.o. Sex: male      Admit Date: 1/30/2018    LOS: 2 days     Subjective:     Patient was seen at bedside. More alert today. Has cough and sore throat. Has green secretions and pharyngeal erythema. Will start empiric antibiotics. Objective:     Vitals:    02/01/18 0735 02/01/18 0810 02/01/18 1109 02/01/18 1500   BP:  (!) 180/100 164/90 164/88   Pulse: 70  62 75   Resp: 16  15 17   Temp: 99.2 °F (37.3 °C)  99.2 °F (37.3 °C) 98.1 °F (36.7 °C)   SpO2: 95%  95% 94%   Weight:       Height:            Intake and Output:  Current Shift:    Last three shifts: 01/31 0701 - 02/01 1900  In: 8377 [I.V.:3566]  Out: 0794 [Urine:2475]    Physical Exam:   GENERAL: drowsy   EYE: conjunctivae/corneas clear. PERRL, EOM's intact. Fundi benign  LYMPHATIC: Cervical, supraclavicular, and axillary nodes normal.   THROAT & NECK: normal and no erythema or exudates noted. LUNG: mild ronchi. HEART: regular rate and rhythm, S1, S2 normal, no murmur, click, rub or gallop  ABDOMEN: soft, non-tender. Bowel sounds normal. No masses,  no organomegaly  EXTREMITIES:  extremities normal, atraumatic, no cyanosis or edema  SKIN: Normal.  NEUROLOGIC: non focal   PSYCHIATRIC: unable to be assessed. Lab/Data Review:  Lab results reviewed. For significant abnormal values and values requiring intervention, see assessment and plan. Assessment:     Principal Problem:    JOSE (acute kidney injury) ([facility] (1/30/2018)    Active Problems:    Altered mental status (1/30/2018)      Abnormal LFTs (1/30/2018)      Major depression (1/30/2018)      Benzodiazepine withdrawal with delirium ([facility] (1/30/2018)        Plan:     -continue IV fluids   -Renal function back to normal   -start empiric antibiotics. Pharyngitis/ aspiration.  Has plenty of green secretions, sore throat and cough  -tested negative for influenza  -monitor VS   -sitter next to him     Signed By: Gm Ruvalcaba MD     February 1, 2018

## 2018-02-02 NOTE — PROGRESS NOTES
Shift Assessment - Pt is alert with confusion. Lung sounds diminished. Upper restraints in place. Patient is currently sleeping in a low, locked bed with call light within reach. Sitter is present .

## 2018-02-02 NOTE — PROGRESS NOTES
Shift assessment completed. Pt is alert and oriented to self. Verbalizes needs. High to bedside bag. Still very sedated but is waking up. Takes pills whole. Has started eating. Denies pain.

## 2018-02-02 NOTE — PROGRESS NOTES
Problem: Interdisciplinary Rounds  Goal: Interdisciplinary Rounds  Interdisciplinary team rounds were held 2/2/2018 with the following team members:Nursing, Nutrition, Pharmacy and Physician. Plan of care discussed. See clinical pathway and/or care plan for interventions and desired outcomes.

## 2018-02-02 NOTE — PROGRESS NOTES
Problem: Nutrition Deficit  Goal: *Optimize nutritional status  Nutrition  Reason for assessment: Length of stay day 3. Assessment:   Diet order(s): Regular  Food/Nutrition Patient History:  Pt presented from [facility] with increased confusion and agitation; was admitted to [facility] for depression and psychosis. Past medical history notable for opiate use disorder. Pt states he wasn't eating too well prior to admission; thinks he may have lost ~ 3# recently. Anthropometrics:Height: 6' (182.9 cm),  Weight: 88.5 kg (195 lb), Weight Source: Bed, Body mass index is 26.45 kg/(m^2). BMI class of overweight. Pt states usual body weight ~ 200#. Currently at 98% UBW. Macronutrient needs:  EER:  1370-2723 kcal /day (20-25 kcal/kg BW)  EPR:  81-97 grams protein/day (1-1.2 grams/kg IBW)  Intake/Comparative Standards: Per RD meal rounds: currently eating supper meal; pt states he ate well at lunch today-appetite picking up; poor po intake past 2 days; 1 recorded intake of 0%. Pt potentially meets ~25-50% estimated kcal and protein needs. Nutrition Diagnosis: Inadequate oral intake r/t decreased ability to consume sufficient oral intake as evidenced by estimates of insufficient intake of energy or high quality protein from diet when compared to requirements, reported weight loss, verbalizes poor po intake past few days. Intervention:  Meals and snacks: Continue current diet. Nutrition Supplement Therapy: Initiated Ensure Enlive 2 X day; flavor preference noted   Discharge nutrition plan: Too soon to determine.   Coordination of Nutrition Care:  Interdisciplinary Rounds    Leatha Judd, [address], MPH  [phone]

## 2018-02-03 PROCEDURE — 74011250637 HC RX REV CODE- 250/637: Performed by: HOSPITALIST

## 2018-02-03 PROCEDURE — 74011000258 HC RX REV CODE- 258: Performed by: INTERNAL MEDICINE

## 2018-02-03 PROCEDURE — 74011250637 HC RX REV CODE- 250/637: Performed by: INTERNAL MEDICINE

## 2018-02-03 PROCEDURE — 65270000029 HC RM PRIVATE

## 2018-02-03 PROCEDURE — 74011250636 HC RX REV CODE- 250/636: Performed by: INTERNAL MEDICINE

## 2018-02-03 RX ORDER — POTASSIUM CHLORIDE 20 MEQ/1
40 TABLET, EXTENDED RELEASE ORAL DAILY
Status: DISCONTINUED | OUTPATIENT
Start: 2018-02-03 | End: 2018-02-04 | Stop reason: HOSPADM

## 2018-02-03 RX ORDER — HYDRALAZINE HYDROCHLORIDE 20 MG/ML
10 INJECTION INTRAMUSCULAR; INTRAVENOUS
Status: DISCONTINUED | OUTPATIENT
Start: 2018-02-03 | End: 2018-02-04

## 2018-02-03 RX ORDER — AMLODIPINE BESYLATE 10 MG/1
10 TABLET ORAL DAILY
Status: DISCONTINUED | OUTPATIENT
Start: 2018-02-04 | End: 2018-02-04 | Stop reason: HOSPADM

## 2018-02-03 RX ORDER — ACETAMINOPHEN 325 MG/1
650 TABLET ORAL
Status: DISCONTINUED | OUTPATIENT
Start: 2018-02-03 | End: 2018-02-04 | Stop reason: HOSPADM

## 2018-02-03 RX ADMIN — QUETIAPINE FUMARATE 25 MG: 25 TABLET, FILM COATED ORAL at 20:17

## 2018-02-03 RX ADMIN — Medication 10 ML: at 05:54

## 2018-02-03 RX ADMIN — Medication 10 ML: at 21:55

## 2018-02-03 RX ADMIN — Medication 10 ML: at 17:44

## 2018-02-03 RX ADMIN — BUPRENORPHINE HYDROCHLORIDE SUBLINGUAL 1 MG: 2 TABLET SUBLINGUAL at 20:19

## 2018-02-03 RX ADMIN — AMOXICILLIN 500 MG: 500 CAPSULE ORAL at 05:53

## 2018-02-03 RX ADMIN — SERTRALINE HYDROCHLORIDE 100 MG: 100 TABLET ORAL at 08:31

## 2018-02-03 RX ADMIN — POTASSIUM CHLORIDE 40 MEQ: 20 TABLET, EXTENDED RELEASE ORAL at 20:17

## 2018-02-03 RX ADMIN — ACETAMINOPHEN 650 MG: 325 TABLET ORAL at 13:30

## 2018-02-03 RX ADMIN — ASPIRIN 81 MG: 81 TABLET, COATED ORAL at 08:31

## 2018-02-03 RX ADMIN — Medication 5 ML: at 14:57

## 2018-02-03 RX ADMIN — DOCUSATE SODIUM 100 MG: 100 CAPSULE, LIQUID FILLED ORAL at 08:31

## 2018-02-03 RX ADMIN — DEXTROSE MONOHYDRATE AND SODIUM CHLORIDE 100 ML/HR: 5; .9 INJECTION, SOLUTION INTRAVENOUS at 14:57

## 2018-02-03 RX ADMIN — QUETIAPINE FUMARATE 25 MG: 25 TABLET, FILM COATED ORAL at 08:30

## 2018-02-03 RX ADMIN — Medication 100 MG: at 08:30

## 2018-02-03 RX ADMIN — BUPRENORPHINE HYDROCHLORIDE SUBLINGUAL 1 MG: 2 TABLET SUBLINGUAL at 08:30

## 2018-02-03 RX ADMIN — AMOXICILLIN 500 MG: 500 CAPSULE ORAL at 14:55

## 2018-02-03 RX ADMIN — ACETAMINOPHEN 650 MG: 325 TABLET ORAL at 20:18

## 2018-02-03 RX ADMIN — ACETAMINOPHEN 650 MG: 325 TABLET ORAL at 00:31

## 2018-02-03 RX ADMIN — DOCUSATE SODIUM 100 MG: 100 CAPSULE, LIQUID FILLED ORAL at 17:43

## 2018-02-03 RX ADMIN — Medication 10 ML: at 12:52

## 2018-02-03 RX ADMIN — AMOXICILLIN 500 MG: 500 CAPSULE ORAL at 21:55

## 2018-02-03 NOTE — PROGRESS NOTES
PRN Haldol given for increased agitation, verbal threats to staff of harm, and increased restlessness.

## 2018-02-03 NOTE — PROGRESS NOTES
Shift Assessment - Pt is alert with confusion. Lung sounds diminished. Right Upper restraint in place. Uses the urinal.  Patient is currently sleeping in a low, locked bed with call light within reach.   Fallon Summers is present

## 2018-02-03 NOTE — PROGRESS NOTES
Progress Note    Patient: Dao Randall MRN: [id number]  SSN: [id number]    YOB: 1965  Age: 47 y.o. Sex: male      Admit Date: 1/30/2018    LOS: 3 days     Subjective:     Patient was seen at bedside. More alert today. Only one point restraint kept. High removed today. Switched to oral amoxicillin today, complains of severe sore throat and has plenty of secretions. No evidence of aspiration pneumonia at this time. Objective:     Vitals:    02/02/18 0734 02/02/18 1117 02/02/18 1536 02/02/18 1816   BP: (!) 180/100 152/88 153/83 164/90   Pulse:  63 63 63   Resp:  12 16 16   Temp:  98.8 °F (37.1 °C) 98.7 °F (37.1 °C) 98.5 °F (36.9 °C)   SpO2:  95% 96% 95%   Weight:       Height:            Intake and Output:  Current Shift:    Last three shifts: 02/01 0701 - 02/02 1900  [facility]: -   [facility]    Physical Exam:   GENERAL: drowsy   EYE: conjunctivae/corneas clear. PERRL, EOM's intact. Fundi benign  LYMPHATIC: Cervical, supraclavicular, and axillary nodes normal.   THROAT & NECK: normal and no erythema or exudates noted. LUNG: mild ronchi. HEART: regular rate and rhythm, S1, S2 normal, no murmur, click, rub or gallop  ABDOMEN: soft, non-tender. Bowel sounds normal. No masses,  no organomegaly  EXTREMITIES:  extremities normal, atraumatic, no cyanosis or edema  SKIN: Normal.  NEUROLOGIC: non focal   PSYCHIATRIC: unable to be assessed. Lab/Data Review:  Lab results reviewed. For significant abnormal values and values requiring intervention, see assessment and plan.          Assessment:     Principal Problem:    JOSE (acute kidney injury) ([facility] (1/30/2018)    Active Problems:    Altered mental status (1/30/2018)      Abnormal LFTs (1/30/2018)      Major depression (1/30/2018)      Benzodiazepine withdrawal with delirium ([address] (1/30/2018)        Plan:     -continue IV fluids   -Renal function back to normal   -switched to amoxicillin to cover for possible pharyngitis/sinusitis -tested negative for influenza  -only on one point restrain  -case discussed with  from [facility] today   -monitor VS   -sitter next to him     Signed By: Amarjit Franklin MD     February 2, 2018

## 2018-02-04 VITALS
SYSTOLIC BLOOD PRESSURE: 114 MMHG | RESPIRATION RATE: 21 BRPM | WEIGHT: 195 LBS | HEART RATE: 75 BPM | TEMPERATURE: 97.9 F | OXYGEN SATURATION: 97 % | HEIGHT: 72 IN | DIASTOLIC BLOOD PRESSURE: 66 MMHG | BODY MASS INDEX: 26.41 KG/M2

## 2018-02-04 LAB
ANION GAP SERPL CALC-SCNC: 11 MMOL/L (ref 7–16)
BUN SERPL-MCNC: 12 MG/DL (ref 6–23)
CALCIUM SERPL-MCNC: 9 MG/DL (ref 8.3–10.4)
CHLORIDE SERPL-SCNC: 103 MMOL/L (ref 98–107)
CO2 SERPL-SCNC: 25 MMOL/L (ref 21–32)
CREAT SERPL-MCNC: 0.68 MG/DL (ref 0.8–1.5)
GLUCOSE SERPL-MCNC: 103 MG/DL (ref 65–100)
POTASSIUM SERPL-SCNC: 4.2 MMOL/L (ref 3.5–5.1)
SODIUM SERPL-SCNC: 139 MMOL/L (ref 136–145)

## 2018-02-04 PROCEDURE — 80048 BASIC METABOLIC PNL TOTAL CA: CPT | Performed by: INTERNAL MEDICINE

## 2018-02-04 PROCEDURE — 74011250637 HC RX REV CODE- 250/637: Performed by: INTERNAL MEDICINE

## 2018-02-04 PROCEDURE — 74011250636 HC RX REV CODE- 250/636: Performed by: INTERNAL MEDICINE

## 2018-02-04 PROCEDURE — 36415 COLL VENOUS BLD VENIPUNCTURE: CPT | Performed by: INTERNAL MEDICINE

## 2018-02-04 PROCEDURE — 74011250637 HC RX REV CODE- 250/637: Performed by: HOSPITALIST

## 2018-02-04 RX ORDER — BUPRENORPHINE 2 MG/1
1 TABLET SUBLINGUAL 2 TIMES DAILY
Qty: 60 TAB | Refills: 0 | Status: SHIPPED
Start: 2018-02-04

## 2018-02-04 RX ORDER — POTASSIUM CHLORIDE 20 MEQ/1
20 TABLET, EXTENDED RELEASE ORAL DAILY
Qty: 10 TAB | Refills: 0 | Status: SHIPPED | OUTPATIENT
Start: 2018-02-04

## 2018-02-04 RX ORDER — ACETAMINOPHEN 325 MG/1
650 TABLET ORAL
Qty: 30 TAB | Refills: 0 | Status: SHIPPED | OUTPATIENT
Start: 2018-02-04

## 2018-02-04 RX ORDER — HYDRALAZINE HYDROCHLORIDE 25 MG/1
25 TABLET, FILM COATED ORAL 2 TIMES DAILY
Qty: 60 TAB | Refills: 0 | Status: SHIPPED | OUTPATIENT
Start: 2018-02-04

## 2018-02-04 RX ORDER — DIPHENHYDRAMINE HCL 50 MG
50 CAPSULE ORAL
Qty: 15 CAP | Refills: 0 | Status: SHIPPED
Start: 2018-02-04 | End: 2018-02-14

## 2018-02-04 RX ORDER — AMLODIPINE BESYLATE 10 MG/1
10 TABLET ORAL DAILY
Qty: 30 TAB | Refills: 0 | Status: SHIPPED | OUTPATIENT
Start: 2018-02-05

## 2018-02-04 RX ORDER — HYDRALAZINE HYDROCHLORIDE 25 MG/1
25 TABLET, FILM COATED ORAL 2 TIMES DAILY
Status: DISCONTINUED | OUTPATIENT
Start: 2018-02-04 | End: 2018-02-04 | Stop reason: HOSPADM

## 2018-02-04 RX ORDER — AMOXICILLIN 500 MG/1
500 CAPSULE ORAL 3 TIMES DAILY
Qty: 15 CAP | Refills: 0 | Status: SHIPPED | OUTPATIENT
Start: 2018-02-04

## 2018-02-04 RX ORDER — HYDRALAZINE HYDROCHLORIDE 20 MG/ML
10 INJECTION INTRAMUSCULAR; INTRAVENOUS
Status: DISCONTINUED | OUTPATIENT
Start: 2018-02-04 | End: 2018-02-04 | Stop reason: HOSPADM

## 2018-02-04 RX ADMIN — POTASSIUM CHLORIDE 40 MEQ: 20 TABLET, EXTENDED RELEASE ORAL at 09:30

## 2018-02-04 RX ADMIN — SERTRALINE HYDROCHLORIDE 100 MG: 100 TABLET ORAL at 09:30

## 2018-02-04 RX ADMIN — Medication 10 ML: at 06:42

## 2018-02-04 RX ADMIN — Medication 100 MG: at 09:30

## 2018-02-04 RX ADMIN — HYDRALAZINE HYDROCHLORIDE 25 MG: 25 TABLET, FILM COATED ORAL at 11:26

## 2018-02-04 RX ADMIN — Medication 10 ML: at 11:27

## 2018-02-04 RX ADMIN — AMLODIPINE BESYLATE 10 MG: 10 TABLET ORAL at 09:31

## 2018-02-04 RX ADMIN — DOCUSATE SODIUM 100 MG: 100 CAPSULE, LIQUID FILLED ORAL at 09:30

## 2018-02-04 RX ADMIN — BUPRENORPHINE HYDROCHLORIDE SUBLINGUAL 1 MG: 2 TABLET SUBLINGUAL at 09:31

## 2018-02-04 RX ADMIN — Medication 10 ML: at 01:46

## 2018-02-04 RX ADMIN — AMOXICILLIN 500 MG: 500 CAPSULE ORAL at 06:41

## 2018-02-04 RX ADMIN — QUETIAPINE FUMARATE 25 MG: 25 TABLET, FILM COATED ORAL at 09:30

## 2018-02-04 RX ADMIN — HYDRALAZINE HYDROCHLORIDE 10 MG: 20 INJECTION INTRAMUSCULAR; INTRAVENOUS at 01:57

## 2018-02-04 RX ADMIN — ASPIRIN 81 MG: 81 TABLET, COATED ORAL at 09:30

## 2018-02-04 NOTE — DISCHARGE SUMMARY
Discharge Summary     Patient: Lev Fuller MRN: [id number]  SSN: [id number]    YOB: 1965  Age: 47 y.o. Sex: male       Admit Date: 1/30/2018    Discharge Date: 2/4/2018      Admission Diagnoses: JOSE (acute kidney injury) ([facility]  Altered mental status    Discharge Diagnoses:   Problem List as of 2/4/2018  Never Reviewed          Codes Class Noted - Resolved    Metabolic encephalopathy SYA-18-YT: G93.41  ICD-9-CM: 348.31  2/2/2018 - Present        Altered mental status ICD-10-CM: R41.82  ICD-9-CM: 780.97  1/30/2018 - Present        * (Principal)JOSE (acute kidney injury) ([facility] ICD-10-CM: N17.9  ICD-9-CM: 584.9  1/30/2018 - Present        Abnormal LFTs ICD-10-CM: R79.89  ICD-9-CM: 790.6  1/30/2018 - Present        Major depression ICD-10-CM: F32.9  ICD-9-CM: 296.20  1/30/2018 - Present        Benzodiazepine withdrawal with delirium ([facility] ICD-10-CM: V71.253  ICD-9-CM: 292.0, 292.81, 304.10  1/30/2018 - Present               Discharge Condition: [place] Course: This is a 46 y/o male with hx opiate use disorder, chronic use of xanax and previous recent use of Adderall who was  sent from [facility] on 1/30/18  for medical evaluation after patient had been having several falls at their facility as well as increasing confusion, agitation. He had been admitted there for treatment of depression and psychosis. In the ER He had no obvious injuries and a head CT was negative. He had no obvious source of infection. He was in  Delirium possibly secondary to benzo withdrawal. His  Labs showed  new acute kidney injury with a Bun 35/Cr 2.04. This is up from 12/0.92 on the 19th. He was placed on 4 points restrains, he was started on IV fluids and IV haldol and IV ativan prn were administered. The patient remained heavily sedated for 48h and after that, no more IV sedatives were administered. His regular dose of buprenorphine and Seroquel was decreased by half.  His renal function improved and he became more alert. His lisinopril was stopped ( due to JOSE) and he has been started on norvasc + hydralazine bid. He complained of severe sore throat and cough. His chest x ray was normal and he tested negative for the flu. He was placed on Amoxicillin and will need 5 more days of treatment. He is being discharged back to [facility] today. I would recommend to reduce the use of NSAIDs to no more than 3 consecutive days in the future. Maybe tramadol PRN could be considered if needed. PE:  GENERAL: drowsy   EYE: conjunctivae/corneas clear. PERRL, EOM's intact. Fundi benign  LYMPHATIC: Cervical, supraclavicular, and axillary nodes normal.   THROAT & NECK: normal and no erythema or exudates noted. LUNG: mild ronchi. HEART: regular rate and rhythm, S1, S2 normal, no murmur, click, rub or gallop  ABDOMEN: soft, non-tender. Bowel sounds normal. No masses,  no organomegaly  EXTREMITIES:  extremities normal, atraumatic, no cyanosis or edema  SKIN: Normal.  NEUROLOGIC: non focal   PSYCHIATRIC: unable to be assessed. Consults: None    Significant Diagnostic Studies: see hospital course     Disposition: home    Discharge Medications:   Current Discharge Medication List      START taking these medications    Details   acetaminophen (TYLENOL) 325 mg tablet Take 2 Tabs by mouth every six (6) hours as needed. Qty: 30 Tab, Refills: 0      amLODIPine (NORVASC) 10 mg tablet Take 1 Tab by mouth daily. Qty: 30 Tab, Refills: 0      amoxicillin (AMOXIL) 500 mg capsule Take 1 Cap by mouth three (3) times daily. Qty: 15 Cap, Refills: 0      hydrALAZINE (APRESOLINE) 25 mg tablet Take 1 Tab by mouth two (2) times a day. Qty: 60 Tab, Refills: 0      potassium chloride (K-DUR, KLOR-CON) 20 mEq tablet Take 1 Tab by mouth daily. Qty: 10 Tab, Refills: 0         CONTINUE these medications which have CHANGED    Details   buprenorphine HCl (SUBUTEX) 2 mg sublingual tablet 0.5 Tabs by SubLINGual route two (2) times a day.  Max Daily Amount: 2 mg.  Qty: 60 Tab, Refills: 0    Associated Diagnoses: Benzodiazepine withdrawal with delirium (HCC)      diphenhydrAMINE (BENADRYL) 50 mg capsule Take 1 Cap by mouth every eight (8) hours as needed for up to 10 days. Qty: 15 Cap, Refills: 0         CONTINUE these medications which have NOT CHANGED    Details   aspirin delayed-release 81 mg tablet Take  by mouth daily. docusate sodium (COLACE) 100 mg capsule Take 100 mg by mouth two (2) times a day. QUEtiapine (SEROQUEL) 50 mg tablet Take 50 mg by mouth two (2) times a day. loperamide (IMMODIUM) 2 mg tablet Take 2 mg by mouth four (4) times daily as needed for Diarrhea.      sertraline (ZOLOFT) 50 mg tablet Take 1 Tab by mouth daily.   Qty: 14 Tab, Refills: 0         STOP taking these medications       ibuprofen (MOTRIN) 800 mg tablet Comments:   Reason for Stopping:         lisinopril (PRINIVIL, ZESTRIL) 10 mg tablet Comments:   Reason for Stopping:         melatonin 3 mg tablet Comments:   Reason for Stopping:         MAG HYDROX/ALUMINUM HYD/SIMETH (MAALOX PLUS EXTRA STRENGTH PO) Comments:   Reason for Stopping:         CLEMASTINE FUMARATE (VISTAMINE PO) Comments:   Reason for Stopping:         hydrOXYzine pamoate (VISTARIL) 50 mg capsule Comments:   Reason for Stopping:         haloperidol lactate (HALDOL) 5 mg/mL injection Comments:   Reason for Stopping:         haloperidol (HALDOL) 5 mg tablet Comments:   Reason for Stopping:               Activity: Activity as tolerated  Diet: Cardiac Diet  Wound Care: None needed    Follow-up Appointments   Procedures    FOLLOW UP VISIT Appointment in: One Month pcp     pcp     Standing Status:   Standing     Number of Occurrences:   1     Order Specific Question:   Appointment in     Answer:   One Month       Signed By: MD Jeyson     February 4, 2018

## 2018-02-04 NOTE — PROGRESS NOTES
Paged MD about patient's BP running high. Patient's home BP meds are already ordered to start this AM and MD stated we would see how these work and no further orders received at this time.

## 2018-02-04 NOTE — PROGRESS NOTES
TRANSFER - OUT REPORT:    Verbal report given to Blaise Lopez on Bronson Pallas  being transferred to [facility] for routine progression of care       Report consisted of patients Situation, Background, Assessment and   Recommendations(SBAR). Information from the following report(s) SBAR, Kardex and ED Summary was reviewed with the receiving nurse. Lines:       Opportunity for questions and clarification was provided.       Patient transported with:  [facility]

## 2018-02-04 NOTE — PROGRESS NOTES
Assessment completed and documented. Patient oriented x 4. Bowel sounds hypoactive. Abdomen soft but patient says it is a little tender. Patient complains of back pain rated at a 10 on 0-10 scale. Medicated with ordered tylenol. Lung sounds diminished in left base. Currently resting in bed with sitter at bedside. Will continue to monitor patient with hourly rounding.

## 2018-02-04 NOTE — PROGRESS NOTES
Patient with complaints of pain to his lower back. He was offered tylenol for pain and refused this. Patient's vital signs were rechecked and his blood pressure is much improved.

## 2018-02-04 NOTE — PROGRESS NOTES
Received bedside shift report from off going nurse Mortimer Brackett RN which included SBAR, MAR, and Plan of Care. Patient is resting quietly with eyes closed and respirations present. Will continue to follow patient's progression through hourly rounding. Will meet all requests as needed and appropriate. Patient is eager for his discharge to [facility] today.

## 2018-02-04 NOTE — PROGRESS NOTES
Patient with complaints of weakness of his legs. He feels as if he should not be discharged to [facility] today. Patient has sitter at bedside.

## 2018-02-04 NOTE — PROGRESS NOTES
Progress Note    Patient: Kavin Ron MRN: [id number]  SSN: [id number]    YOB: 1965  Age: 47 y.o. Sex: male      Admit Date: 1/30/2018    LOS: 4 days     Subjective:     Patient was seen at bedside. More alert today. Off restrains. Will resume BP meds. Objective:     Vitals:    02/03/18 1200 02/03/18 1320 02/03/18 1555 02/03/18 1820   BP: 144/80 (!) 166/95 169/89 (!) 161/98   Pulse: 61 67 80 63   Resp: 18 18 18 18   Temp: 98.3 °F (36.8 °C) 97.6 °F (36.4 °C) 97.6 °F (36.4 °C) 97.8 °F (36.6 °C)   SpO2: 97% 95% 97% 98%   Weight:       Height:            Intake and Output:  Current Shift:    Last three shifts: 02/02 0701 - 02/03 1900  In: -   Out: 2625 [Urine:2625]    Physical Exam:   GENERAL: drowsy   EYE: conjunctivae/corneas clear. PERRL, EOM's intact. Fundi benign  LYMPHATIC: Cervical, supraclavicular, and axillary nodes normal.   THROAT & NECK: normal and no erythema or exudates noted. LUNG: mild ronchi. HEART: regular rate and rhythm, S1, S2 normal, no murmur, click, rub or gallop  ABDOMEN: soft, non-tender. Bowel sounds normal. No masses,  no organomegaly  EXTREMITIES:  extremities normal, atraumatic, no cyanosis or edema  SKIN: Normal.  NEUROLOGIC: non focal   PSYCHIATRIC: unable to be assessed. Lab/Data Review:  Lab results reviewed. For significant abnormal values and values requiring intervention, see assessment and plan.          Assessment:     Principal Problem:    JOSE (acute kidney injury) ([facility] (1/30/2018)    Active Problems:    Altered mental status (1/30/2018)      Abnormal LFTs (1/30/2018)      Major depression (1/30/2018)      Benzodiazepine withdrawal with delirium ([facility] (2/14/2977)      Metabolic encephalopathy (9/0/8590)        Plan:     -stop IV fluids   -Renal function back to normal   -switched to amoxicillin to cover for possible pharyngitis/sinusitis   -start norvasc for HTN   -IV hydralazine prn   -off restrains   -monitor VS   -sitter next to him Signed By: Dorene Hatch MD     February 3, 2018